# Patient Record
Sex: FEMALE | Race: WHITE | NOT HISPANIC OR LATINO | Employment: FULL TIME | URBAN - METROPOLITAN AREA
[De-identification: names, ages, dates, MRNs, and addresses within clinical notes are randomized per-mention and may not be internally consistent; named-entity substitution may affect disease eponyms.]

---

## 2017-08-03 ENCOUNTER — ALLSCRIPTS OFFICE VISIT (OUTPATIENT)
Dept: OTHER | Facility: OTHER | Age: 42
End: 2017-08-03

## 2017-08-03 DIAGNOSIS — Z12.31 ENCOUNTER FOR SCREENING MAMMOGRAM FOR MALIGNANT NEOPLASM OF BREAST: ICD-10-CM

## 2017-09-11 ENCOUNTER — HOSPITAL ENCOUNTER (OUTPATIENT)
Dept: MAMMOGRAPHY | Facility: MEDICAL CENTER | Age: 42
Discharge: HOME | End: 2017-09-11
Payer: COMMERCIAL

## 2017-09-11 DIAGNOSIS — Z12.31 ENCOUNTER FOR SCREENING MAMMOGRAM FOR MALIGNANT NEOPLASM OF BREAST: ICD-10-CM

## 2017-09-11 PROCEDURE — G0202 SCR MAMMO BI INCL CAD: HCPCS

## 2017-09-11 PROCEDURE — 77063 BREAST TOMOSYNTHESIS BI: CPT

## 2018-01-14 VITALS
BODY MASS INDEX: 21.63 KG/M2 | SYSTOLIC BLOOD PRESSURE: 120 MMHG | DIASTOLIC BLOOD PRESSURE: 78 MMHG | HEIGHT: 66 IN | WEIGHT: 134.56 LBS

## 2018-11-12 ENCOUNTER — EVALUATION (OUTPATIENT)
Dept: PHYSICAL THERAPY | Facility: CLINIC | Age: 43
End: 2018-11-12
Payer: COMMERCIAL

## 2018-11-12 ENCOUNTER — TRANSCRIBE ORDERS (OUTPATIENT)
Dept: PHYSICAL THERAPY | Facility: CLINIC | Age: 43
End: 2018-11-12

## 2018-11-12 DIAGNOSIS — Z98.890 S/P ARTHROSCOPY OF KNEE: Primary | ICD-10-CM

## 2018-11-12 DIAGNOSIS — Z98.890 S/P LEFT KNEE ARTHROSCOPY: Primary | ICD-10-CM

## 2018-11-12 PROCEDURE — G8991 OTHER PT/OT GOAL STATUS: HCPCS | Performed by: PHYSICAL THERAPIST

## 2018-11-12 PROCEDURE — G8990 OTHER PT/OT CURRENT STATUS: HCPCS | Performed by: PHYSICAL THERAPIST

## 2018-11-12 PROCEDURE — 97161 PT EVAL LOW COMPLEX 20 MIN: CPT | Performed by: PHYSICAL THERAPIST

## 2018-11-12 NOTE — LETTER
2018    Jay Jay Kamara MD  [address]    Patient: Christian Degroot   YOB: 1975   Date of Visit: 2018     Encounter Diagnosis     ICD-10-CM    1  S/P left knee arthroscopy Z98 890        Dear Dr Luisa Sauceda:    Please review the attached Plan of Care from Wesly Murguia's recent visit  Please verify that you agree therapy should continue by signing the attached document and sending it back to our office  If you have any questions or concerns, please don't hesitate to call  Sincerely,    Mello Zepeda, PT      Referring Provider:      I certify that I have read the below Plan of Care and certify the need for these services furnished under this plan of treatment while under my care  Jay Jay Kamara MD  [address] [place] [address],[address]: [phone]          PT Evaluation    Today's date: 2018  Patient name: Christian Degroot  : 1975  MRN: [id number]  Referring provider: Mary Beth Oliver*  Dx:   Encounter Diagnosis     ICD-10-CM    1  S/P left knee arthroscopy Z98 890                   Assessment  Impairments: abnormal muscle firing, abnormal muscle tone, abnormal or restricted ROM, abnormal movement, activity intolerance, impaired physical strength, lacks appropriate home exercise program and pain with function  Functional limitations: pain with stair climbing, walking longer distances, unable to participate in recreational activities, unable to work  Symptom irritability: low  Assessment details: Christian Degroot is a pleasant 37 y o  female who presents s/p L knee arthroscopy with partial medial menisectomy 10/25/18  Referral does not appear necessary at this time based on examination results  Swelling in her knee contributes to decreased ROM, which limits her ability to climb stairs at home  Impaired quad activation also contributes to increased pain when walking longer distances  Patient would benefit from skilled PT services to improve ROM and 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  Impairments include:  1) L knee swelling--> addressing with icing/elevating HEP and progressive exercise  2) Decreased L quad activation--> addressing with neuromotor re-training  3) Decreased L knee FLX/EXT ROM--> addressing with stretching and progressive exercise  4) Decreased L quads/HS strength--> addressing with progressive exercise      Barriers to therapy: none  Understanding of Dx/Px/POC: good   Prognosis: good  Prognosis details: Positive prognostic indicators include positive attitude toward recovery, lack of co-morbidities, and high PLOF  Goals  ST  Patient will be independent with home exercise program in 2 weeks  2  Patient will decrease swelling in her L knee in 2 weeks  LT  Patient will demonstrate symmetrical knee ROM bilaterally for improved stair climbing ability in 6 weeks  2  Patient will improve L knee FLX/EXT strength to be able to stand for prolonged periods in 6 weeks  3  Patient will be able to walk for 30 min with minimal knee pain in 6 weeks  4  Patient will be able to return to work with modifications as necessary in 6 weeks        Plan  Patient would benefit from: skilled PT  Referral necessary: No  Planned modality interventions: thermotherapy: hydrocollator packs and cryotherapy  Planned therapy interventions: activity modification, joint mobilization, manual therapy, motor coordination training, neuromuscular re-education, patient education, therapeutic activities, therapeutic exercise, graded activity, home exercise program, [place] taping, strengthening, flexibility, functional ROM exercises and graded exercise  Frequency: 2x week  Duration in weeks: 6  Treatment plan discussed with: patient  Plan details: Prognosis above is given PT services 2x/week tapering to 1x/week over the next 2 months and home program adherence  Subjective Evaluation    History of Present Illness  Date of surgery: 10/25/2018  Mechanism of injury: surgery  Mechanism of injury: This is a 36 yo female presenting s/p L partial medial menisectomy on 10/25/18  She is currently WBAT  She is not sure how she developed her meniscal tear, but her knee became sore after tennis  She thinks the actual tear happened when carrying a heavy box down the stairs, and she heard a pop  Her current functional limitations include pain with stair climbing and pain when turning with walking  She also would like to get back to running, playing tennis, and active lifestyle, and 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  She has a hx of 3 compression fractures in her thoracic and lumbar spine when she was 15  No other significant co-morbidities or surgical hx  Quality of life: good    Pain  Current pain ratin  At best pain ratin  At worst pain ratin  Location: anterior knee, medial knee, lateral knee  Quality: discomfort, throbbing and burning  Relieving factors: medications and ice (ibuprofen/tylenol)  Aggravating factors: standing, walking and stair climbing  Progression: improved    Social Support  Stairs in house: yes       Diagnostic Tests  X-ray: abnormal (per pt report- very mild arthritis)  MRI studies: abnormal (prior to surgery)  Patient Goals  Patient goals for therapy: decreased pain, increased motion, return to sport/leisure activities and return to work  Patient goal: return to active lifestyle        Objective     Observations   Left Knee   Positive for edema and incision  Additional Observation Details  Steri-strips intact; incisions unable to be visualized due to steri-strips  No redness/warmth/drainage present      Neurological Testing     Sensation     Knee   Left Knee   Diminished: light touch     Right Knee   Intact: light touch     Comments   Left light touch: lateral knee, medial knee, lateral leg       Active Range of Motion   Left Knee   Flexion: 125 degrees   Extension: 2 degrees     Right Knee   Flexion: 140 degrees   Extension: 0 degrees     Strength/Myotome Testing     Left Knee   Quadriceps contraction: fair    Right Knee   Flexion: 4+  Extension: 4+  Quadriceps contraction: good    Additional Strength Details  Did not test L knee strength due to being s/p menisectomy     Tests     Additional Tests Details  - Homans b/l     Special tests deferred due to surgery    Swelling     Left Knee Girth Measurement (cm)   Joint line: 36 cm  10 cm above joint line: 37 cm  10 cm below joint line: 33 cm    Right Knee Girth Measurement (cm)   Joint line: 35 cm  10 cm above joint line: 37 cm  10 cm below joint line: 32 cm          Precautions: hx of 3 compression fractures in thoracic/lumbar spines, s/p L knee arthroscopy with partial medial menisectomy 10/25/2018    Daily Treatment Diary     Manual  11/12                                                                                 Exercise Diary              QS HEP            Strap gastroc stretch HEP            Heel slides HEP            SLR HEP            Heel raises HEP            Mini squats HEP                                                                                                                                                                                                      Modalities

## 2018-11-12 NOTE — PROGRESS NOTES
PT Evaluation    Today's date: 2018  Patient name: Sherrill Villafana  : 1975  MRN: [id number]  Referring provider: Gwyn Stone*  Dx:   Encounter Diagnosis     ICD-10-CM    1  S/P left knee arthroscopy Z98 890                   Assessment  Impairments: abnormal muscle firing, abnormal muscle tone, abnormal or restricted ROM, abnormal movement, activity intolerance, impaired physical strength, lacks appropriate home exercise program and pain with function  Functional limitations: pain with stair climbing, walking longer distances, unable to participate in recreational activities, unable to work  Symptom irritability: low  Assessment details: Sherrill Villafana is a pleasant 37 y o  female who presents s/p L knee arthroscopy with partial medial menisectomy 10/25/18  Referral does not appear necessary at this time based on examination results  Swelling in her knee contributes to decreased ROM, which limits her ability to climb stairs at home  Impaired quad activation also contributes to increased pain when walking longer distances  Patient would benefit from skilled PT services to improve ROM and 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  Impairments include:  1) L knee swelling--> addressing with icing/elevating HEP and progressive exercise  2) Decreased L quad activation--> addressing with neuromotor re-training  3) Decreased L knee FLX/EXT ROM--> addressing with stretching and progressive exercise  4) Decreased L quads/HS strength--> addressing with progressive exercise      Barriers to therapy: none  Understanding of Dx/Px/POC: good   Prognosis: good  Prognosis details: Positive prognostic indicators include positive attitude toward recovery, lack of co-morbidities, and high PLOF  Goals  ST  Patient will be independent with home exercise program in 2 weeks  2  Patient will decrease swelling in her L knee in 2 weeks  LT   Patient will demonstrate symmetrical knee ROM bilaterally for improved stair climbing ability in 6 weeks  2  Patient will improve L knee FLX/EXT strength to be able to stand for prolonged periods in 6 weeks  3  Patient will be able to walk for 30 min with minimal knee pain in 6 weeks  4  Patient will be able to return to work with modifications as necessary in 6 weeks  Plan  Patient would benefit from: skilled PT  Referral necessary: No  Planned modality interventions: thermotherapy: hydrocollator packs and cryotherapy  Planned therapy interventions: activity modification, joint mobilization, manual therapy, motor coordination training, neuromuscular re-education, patient education, therapeutic activities, therapeutic exercise, graded activity, home exercise program, [place] taping, strengthening, flexibility, functional ROM exercises and graded exercise  Frequency: 2x week  Duration in weeks: 6  Treatment plan discussed with: patient  Plan details: Prognosis above is given PT services 2x/week tapering to 1x/week over the next 2 months and home program adherence  Subjective Evaluation    History of Present Illness  Date of surgery: 10/25/2018  Mechanism of injury: surgery  Mechanism of injury: This is a 38 yo female presenting s/p L partial medial menisectomy on 10/25/18  She is currently WBAT  She is not sure how she developed her meniscal tear, but her knee became sore after tennis  She thinks the actual tear happened when carrying a heavy box down the stairs, and she heard a pop  Her current functional limitations include pain with stair climbing and pain when turning with walking  She also would like to get back to running, playing tennis, and active lifestyle, and 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  She has a hx of 3 compression fractures in her thoracic and lumbar spine when she was 15  No other significant co-morbidities or surgical hx     Quality of life: good    Pain  Current pain ratin  At best pain rating: 1  At worst pain ratin  Location: anterior knee, medial knee, lateral knee  Quality: discomfort, throbbing and burning  Relieving factors: medications and ice (ibuprofen/tylenol)  Aggravating factors: standing, walking and stair climbing  Progression: improved    Social Support  Stairs in house: yes       Diagnostic Tests  X-ray: abnormal (per pt report- very mild arthritis)  MRI studies: abnormal (prior to surgery)  Patient Goals  Patient goals for therapy: decreased pain, increased motion, return to sport/leisure activities and return to work  Patient goal: return to active lifestyle        Objective     Observations   Left Knee   Positive for edema and incision  Additional Observation Details  Steri-strips intact; incisions unable to be visualized due to steri-strips  No redness/warmth/drainage present      Neurological Testing     Sensation     Knee   Left Knee   Diminished: light touch     Right Knee   Intact: light touch     Comments   Left light touch: lateral knee, medial knee, lateral leg       Active Range of Motion   Left Knee   Flexion: 125 degrees   Extension: 2 degrees     Right Knee   Flexion: 140 degrees   Extension: 0 degrees     Strength/Myotome Testing     Left Knee   Quadriceps contraction: fair    Right Knee   Flexion: 4+  Extension: 4+  Quadriceps contraction: good    Additional Strength Details  Did not test L knee strength due to being s/p menisectomy     Tests     Additional Tests Details  - Homans b/l     Special tests deferred due to surgery    Swelling     Left Knee Girth Measurement (cm)   Joint line: 36 cm  10 cm above joint line: 37 cm  10 cm below joint line: 33 cm    Right Knee Girth Measurement (cm)   Joint line: 35 cm  10 cm above joint line: 37 cm  10 cm below joint line: 32 cm          Precautions: hx of 3 compression fractures in thoracic/lumbar spines, s/p L knee arthroscopy with partial medial menisectomy 10/25/2018    Daily Treatment Diary     Manual   Exercise Diary              QS HEP            Strap gastroc stretch HEP            Heel slides HEP            SLR HEP            Heel raises HEP            Mini squats HEP                                                                                                                                                                                                      Modalities

## 2018-11-15 ENCOUNTER — OFFICE VISIT (OUTPATIENT)
Dept: PHYSICAL THERAPY | Facility: CLINIC | Age: 43
End: 2018-11-15
Payer: COMMERCIAL

## 2018-11-15 DIAGNOSIS — Z98.890 S/P LEFT KNEE ARTHROSCOPY: Primary | ICD-10-CM

## 2018-11-15 PROCEDURE — 97110 THERAPEUTIC EXERCISES: CPT

## 2018-11-15 NOTE — PROGRESS NOTES
Daily Note     Today's date: 11/15/2018  Patient name: Winsome Jj  : 1975  MRN: [id number]  Referring provider: Sadiq Rose*  Dx:   Encounter Diagnosis     ICD-10-CM    1  S/P left knee arthroscopy Z98 890                   Subjective: Patient reports HEP going well  Reports gets some pain with prolonged standing but overall feels good  Objective: See treatment diary below      Assessment: Patient challenged by exercises focusing on VMO strengthening  Patient expressed some discomfort with step ups secondary to knee instability  Plan: Continue per plan of care  Precautions: hx of 3 compression fractures in thoracic/lumbar spines, s/p L knee arthroscopy with partial medial menisectomy 10/25/2018    Daily Treatment Diary     Manual  11/12 11/15                                                                                Exercise Diary   11/15           QS HEP 10"x  10           Strap gastroc stretch HEP 30"x4           Heel slides HEP 5"x30           SLR x4 HEP 3x10 ea           Heel raises HEP -           Mini squats HEP 3x10           SAQ  3x10           LAQ  3x10           Lateral Step downs  P!            Standing hamstring curls  3x10           Hamstring stretch  30"x4                                                                                                                   [facility]  10'               Modalities

## 2018-11-19 ENCOUNTER — OFFICE VISIT (OUTPATIENT)
Dept: PHYSICAL THERAPY | Facility: CLINIC | Age: 43
End: 2018-11-19
Payer: COMMERCIAL

## 2018-11-19 DIAGNOSIS — Z98.890 S/P LEFT KNEE ARTHROSCOPY: Primary | ICD-10-CM

## 2018-11-19 PROCEDURE — 97010 HOT OR COLD PACKS THERAPY: CPT | Performed by: PHYSICAL THERAPIST

## 2018-11-19 PROCEDURE — 97110 THERAPEUTIC EXERCISES: CPT | Performed by: PHYSICAL THERAPIST

## 2018-11-19 NOTE — PROGRESS NOTES
Daily Note     Today's date: 2018  Patient name: Heron Campo  : 1975  MRN: [id number]  Referring provider: Kiley Ramsay*  Dx:   Encounter Diagnosis     ICD-10-CM    1  S/P left knee arthroscopy Z98 890                   Subjective: Pt  Reports increased pain for 2 days following last therapy session  Objective: See treatment diary below      Assessment:Valgus knee control is reduced in pt  Due to poor hip strength  Session focused on appropriate knee control during both CKC and OKC exercises in accordance with post op protocol  Pt  Educated on role of PT to promote tissue healing and post op function  Pt  Would benefit from continued PT to maximize function  Plan: Continue per plan of care  Progress treatment as tolerated        Precautions: hx of 3 compression fractures in thoracic/lumbar spines, s/p L knee arthroscopy with partial medial menisectomy 10/25/2018    Daily Treatment Diary     Manual  11/12 11/15 11/19          Patellar Mobz - - NV                                                                  Exercise Diary   11/15 11/19          QS HEP 10"x  10 5"/5" 3 min          Strap gastroc stretch HEP 30"x4 4x30"          Heel slides HEP 5"x30 5"x30          SLR x4 HEP 3x10 ea 30x ea          Heel raises HEP - -          Mini squats HEP 3x10 -          SAQ  3x10 3x10          LAQ  3x10 30x 90-40          Lateral Step downs  P! -          Standing hamstring curls  3x10 -          Hamstring stretch  30"x4           Ball Squats - - With nlei                                                                                                     [address] [place] [address]  10' 10'              Modalities

## 2018-11-21 ENCOUNTER — OFFICE VISIT (OUTPATIENT)
Dept: PHYSICAL THERAPY | Facility: CLINIC | Age: 43
End: 2018-11-21
Payer: COMMERCIAL

## 2018-11-21 DIAGNOSIS — Z98.890 S/P LEFT KNEE ARTHROSCOPY: Primary | ICD-10-CM

## 2018-11-21 PROCEDURE — 97110 THERAPEUTIC EXERCISES: CPT | Performed by: PHYSICAL THERAPIST

## 2018-11-21 NOTE — PROGRESS NOTES
Daily Note     Today's date: 2018  Patient name: Naomi Emerson  : 1975  MRN: [id number]  Referring provider: Shahrzad Bliss*  Dx:   Encounter Diagnosis     ICD-10-CM    1  S/P left knee arthroscopy Z98 890                   Subjective: Pt  Notes last session was tolerated much better than previous  No pain reported  Objective: See treatment diary below      Assessment: Annie Murguia was progressed with PT interventions, focusing on appropriate technique and intensity  Pt  Required frequent cuing from therapist to complete  Pt  Progressed with post op protocl beginning leg press and weighted SLR Pt  Would benefit from continued physical therapy to promote improved activity tolerance and function  Plan: Continue per plan of care  Progress treatment as tolerated        Precautions: hx of 3 compression fractures in thoracic/lumbar spines, s/p L knee arthroscopy with partial medial menisectomy 10/25/2018    Daily Treatment Diary     Manual  11/12 11/15 11/19 11/21         Patellar Mobz - - NV 8'                                                                 Exercise Diary   11/15 11/19 11/21         QS HEP 10"x  10 5"/5" 3 min HEP         Strap gastroc stretch HEP 30"x4 4x30" 4x30"         Heel slides HEP 5"x30 5"x30 -         SLR x4 HEP 3x10 ea 30x ea 30x ea 2#         Heel raises HEP - - See below         Mini squats HEP 3x10 - -         SAQ  3x10 3x10 2# 3x10         LAQ  3x10 30x 90-40 -         Lateral Step downs  P! - - -        Standing hamstring curls  3x10 - - -        Hamstring stretch  30"x4  4x30"         Ball Squats - - With clamshell -         Leg Press - - - 40# 2x10         TKE with TB - - - See below         Elliptical - - - - -        ITB Stretch - - - 4x30"         Heel rise with TKE - - - 2x15# 20x                                   [facility]  10' 10' 10' L2             Modalities

## 2018-11-26 ENCOUNTER — OFFICE VISIT (OUTPATIENT)
Dept: PHYSICAL THERAPY | Facility: CLINIC | Age: 43
End: 2018-11-26
Payer: COMMERCIAL

## 2018-11-26 DIAGNOSIS — Z98.890 S/P LEFT KNEE ARTHROSCOPY: Primary | ICD-10-CM

## 2018-11-26 PROCEDURE — 97110 THERAPEUTIC EXERCISES: CPT | Performed by: PHYSICAL THERAPIST

## 2018-11-26 NOTE — PROGRESS NOTES
Daily Note     Today's date: 2018  Patient name: Tiara Simmons  : 1975  MRN: [id number]  Referring provider: Flex Oliveira*  Dx:   Encounter Diagnosis     ICD-10-CM    1  S/P left knee arthroscopy Z98 890                   Subjective: Pt  Notes knee is feeling good  She feels current program is helping  Objective: See treatment diary below      Assessment: Pt  Began elliptical training per protocol and tolerated well  She requires cuing for knee stabilization during OKC and CKC exercise  Pt  Denies any pain following treatment  Pt  Would benefit form continued PT to maximize funciton and increase activity tolerance  Plan: Continue per plan of care  Progress treatment as tolerated        Precautions: hx of 3 compression fractures in thoracic/lumbar spines, s/p L knee arthroscopy with partial medial menisectomy 10/25/2018    Daily Treatment Diary     Manual  11/12 11/15 11/19 11/21 11/26        Patellar Mobz - - NV 8' -                                                                Exercise Diary   11/15 11/19 11/21 11/26        QS HEP 10"x  10 5"/5" 3 min HEP -        Strap gastroc stretch HEP 30"x4 4x30" 4x30"         Heel slides HEP 5"x30 5"x30 - -        SLR x4 HEP 3x10 ea 30x ea 30x ea 2# 30x 2# ea        Heel raises HEP - - See below -        Mini squats HEP 3x10 - - -        SAQ  3x10 3x10 2# 3x10 2# 3x10        LAQ  3x10 30x 90-40 - 2# 0-90 20x        Lateral Step downs  P! - - -        Standing hamstring curls  3x10 - - -        Hamstring stretch  30"x4  4x30" 4c30"        Ball Squats - - With clamshell - -        Leg Press - - - 40# 2x10 40# 3x10        TKE with TB - - - See below -        Elliptical - - - - L2 6 min        ITB Stretch - - - 4x30" 4x30"        Heel rise with TKE - - - 2x15# 20x 2x15" 30x                                  [address] [facility]  10' 10' 10' L2 10' L2            Modalities                   CP 10 min

## 2018-11-28 ENCOUNTER — OFFICE VISIT (OUTPATIENT)
Dept: PHYSICAL THERAPY | Facility: CLINIC | Age: 43
End: 2018-11-28
Payer: COMMERCIAL

## 2018-11-28 DIAGNOSIS — Z98.890 S/P LEFT KNEE ARTHROSCOPY: Primary | ICD-10-CM

## 2018-11-28 PROCEDURE — 97110 THERAPEUTIC EXERCISES: CPT | Performed by: PHYSICAL THERAPIST

## 2018-11-28 PROCEDURE — 97010 HOT OR COLD PACKS THERAPY: CPT

## 2018-11-28 NOTE — PROGRESS NOTES
Daily Note     Today's date: 2018  Patient name: Krystal Garcia  : 1975  MRN: [id number]  Referring provider: Ila Bales*  Dx:   No diagnosis found  Subjective: Pt  Notes knee is feeling good  "Just little more tightness the last few days  She reports heading to the doctor later today"  Objective: See treatment diary below      Assessment: Pt  Completed TE program within quad fatigue and tightness to LLE  Good tolerance to strengthening exercise but requires cues for correct reps and 's  Denies any elevated sx's pre-post session  Patient would benefit from cont PT to maximize function and increase to activity  Able to progress patient leg press 50# 2/10  Plan: Continue per plan of care  Progress treatment as tolerated        Precautions: hx of 3 compression fractures in thoracic/lumbar spines, s/p L knee arthroscopy with partial medial menisectomy 10/25/2018    Daily Treatment Diary     Manual  11/12 11/15 11/19 11/21 11/26 11/28       Patellar Mobz - - NV 8' -                                                                Exercise Diary   11/15 11/19 11/21 11/26 11/28       QS HEP 10"x  10 5"/5" 3 min HEP -        Strap gastroc stretch HEP 30"x4 4x30" 4x30"  4/30''       Heel slides HEP 5"x30 5"x30 - - 5/30''       SLR x4 HEP 3x10 ea 30x ea 30x ea 2# 30x 2# ea 30x  2# ea       Heel raises HEP - - See below -        Mini squats HEP 3x10 - - -        SAQ  3x10 3x10 2# 3x10 2# 3x10 2#  3x10       LAQ  3x10 30x 90-40 - 2# 0-90 20x 2#  0-90  20 x       Lateral Step downs  P! - - -        Standing hamstring curls  3x10 - - -        Hamstring stretch  30"x4  4x30" 4c30" 4x 30''       Ball Squats - - With clamshell - -        Leg Press - - - 40# 2x10 40# 3x10 50#  3x10       TKE with TB - - - See below -        Elliptical - - - - L2 6 min L2 5 min's       ITB Stretch - - - 4x30" 4x30" 4x30''       Heel rise with TKE - - - 2x15# 20x 2x15" 30x 2x(15#UE) 30x [address] [facility]  10' 10' 10' L2 10' L2 5' L2           Modalities      11/26 11/28            CP 10 min CP 10 min's

## 2018-12-04 ENCOUNTER — OFFICE VISIT (OUTPATIENT)
Dept: PHYSICAL THERAPY | Facility: CLINIC | Age: 43
End: 2018-12-04
Payer: COMMERCIAL

## 2018-12-04 DIAGNOSIS — Z98.890 S/P LEFT KNEE ARTHROSCOPY: Primary | ICD-10-CM

## 2018-12-04 PROCEDURE — 97140 MANUAL THERAPY 1/> REGIONS: CPT | Performed by: PHYSICAL THERAPIST

## 2018-12-04 PROCEDURE — 97110 THERAPEUTIC EXERCISES: CPT | Performed by: PHYSICAL THERAPIST

## 2018-12-04 NOTE — PROGRESS NOTES
Daily Note     Today's date: 2018  Patient name: Maxine Anderson  : 1975  MRN: [id number]  Referring provider: Leon Hyde*  Dx:   Encounter Diagnosis     ICD-10-CM    1  S/P left knee arthroscopy Z98 890                   Subjective: Pt  States increased lateral knee pain over the weekend which sh eattributes to painting her bathroom  Objective: See treatment diary below      Assessment: Annie Murguia was progressed with PT interventions, focusing on appropriate technique and intensity  Pt  Required mod cuing from therapist to complete  Pt progressed with CKC strenghtening to promote improved knee stability  Pt  Would benefit from continued physical therapy to promote improved activity tolerance and function  Plan: Continue per plan of care  Progress treatment as tolerated        Precautions: hx of 3 compression fractures in thoracic/lumbar spines, s/p L knee arthroscopy with partial medial menisectomy 10/25/2018    Daily Treatment Diary     Manual  11/12 11/15 11/19 11/21 11/26 11/28 12      Patellar Mobz - - NV 8' - - 3 min      IASTM - - - - - -  5 min      Foam Rolling - - - - - - 5 min                                    Exercise Diary   11/15 11/19 11/21 11/26 11/28 12/4      QS HEP 10"x  10 5"/5" 3 min HEP -  -      Strap gastroc stretch HEP 30"x4 4x30" 4x30"  4/30'' 4x30"      Heel slides HEP 5"x30 5"x30 - - 5/30'' -      SLR x4 HEP 3x10 ea 30x ea 30x ea 2# 30x 2# ea 30x  2# ea 2# 30x ea      Heel raises HEP - - See below -  -      Mini squats HEP 3x10 - - -  15# 20x      SAQ  3x10 3x10 2# 3x10 2# 3x10 2#  3x10 2# 30x      LAQ  3x10 30x 90-40 - 2# 0-90 20x 2#  0-90  20 x 2# 30x      Lateral Step downs  P! - - -  -      Standing hamstring curls  3x10 - - -  -      Hamstring stretch  30"x4  4x30" 4c30" 4x 30''       Ball Squats - - With clamshell - -  -      Leg Press - - - 40# 2x10 40# 3x10 50#  3x10 50# 3x10      TKE with TB - - - See below -  -      Elliptical - - - - L2 6 min L2 5 min's L2 5 min      ITB Stretch - - - 4x30" 4x30" 4x30'' 4x30"      Heel rise with TKE - - - 2x15# 20x 2x15" 30x 2x(15#UE) 30x 30x 15#      Bridges on bosu - - - - - - 30x with orange TB                   [address]  10' 10' 10' L2 10' L2 5' L2 10' L2          Modalities      11/26 11/28 12/4           CP 10 min CP 10 min's CP 10 min

## 2018-12-06 ENCOUNTER — OFFICE VISIT (OUTPATIENT)
Dept: PHYSICAL THERAPY | Facility: CLINIC | Age: 43
End: 2018-12-06
Payer: COMMERCIAL

## 2018-12-06 DIAGNOSIS — Z98.890 S/P LEFT KNEE ARTHROSCOPY: Primary | ICD-10-CM

## 2018-12-06 PROCEDURE — 97140 MANUAL THERAPY 1/> REGIONS: CPT

## 2018-12-06 PROCEDURE — 97110 THERAPEUTIC EXERCISES: CPT

## 2018-12-06 NOTE — PROGRESS NOTES
Daily Note     Today's date: 2018  Patient name: Naomi Emerson  : 1975  MRN: [id number]  Referring provider: Shahrzad Bliss*  Dx:   Encounter Diagnosis     ICD-10-CM    1  S/P left knee arthroscopy Z98 890                   Subjective: Patient reports pain on arrival a 0/10  Reports after last visit port sites were a little sore but next day she was fine  Objective: See treatment diary below      Assessment: Naomi Emerson continues with increase in function  Weakness appears to most limiting factor  Annie Murguia was able to perform exercises with proper technique and intensity  Jamie Chacon has increased  in ability to perform functional exercises  she would benefit from continued PT to decrease pain and improve daily function  Jamie Chacon is scheduled to return to work   Plan: Continue per plan of care  Monitor effect of RTW          Precautions: hx of 3 compression fractures in thoracic/lumbar spines, s/p L knee arthroscopy with partial medial menisectomy 10/25/2018    Daily Treatment Diary     Manual  11/12 11/15 11/19 11/21 11/26 11/28 12/4 12/6     Patellar Mobz - - NV 8' - - 3 min 3 min     IASTM - - - - - -  5 min 5 min     Foam Rolling - - - - - - 5 min 5 min                                   Exercise Diary   11/15 11/19 11/21 11/26 11/28 12/4 12     QS HEP 10"x  10 5"/5" 3 min HEP -  -      Strap gastroc stretch HEP 30"x4 4x30" 4x30"  4/30'' 4x30" 4x30"     Heel slides HEP 5"x30 5"x30 - - 5/30'' -      SLR x4 HEP 3x10 ea 30x ea 30x ea 2# 30x 2# ea 30x  2# ea 2# 30x ea 2# 30x ea     Heel raises HEP - - See below -  -      Mini squats HEP 3x10 - - -  15# 20x 15# 30x     SAQ  3x10 3x10 2# 3x10 2# 3x10 2#  3x10 2# 30x 2# 30x     LAQ  3x10 30x 90-40 - 2# 0-90 20x 2#  0-90  20 x 2# 30x 2# 30x     Lateral Step downs  P! - - -  -      Standing hamstring curls  3x10 - - -  -      Hamstring stretch  30"x4  4x30" 4c30" 4x 30''  4x30"     Ball Squats - - With clamshell - -  - Leg Press - - - 40# 2x10 40# 3x10 50#  3x10 50# 3x10 50# 3x10     TKE with TB - - - See below -  -      Elliptical - - - - L2 6 min L2 5 min's L2 5 min L2  5 min     ITB Stretch - - - 4x30" 4x30" 4x30'' 4x30" 4x30"     Heel rise with TKE - - - 2x15# 20x 2x15" 30x 2x(15#UE) 30x 30x 15# 30x 15#     Bridges on bosu - - - - - - 30x with orange TB 30x with orange TB                  [address] [facility]  10' 10' 10' L2 10' L2 5' L2 10' L2 10' L4         Modalities      11/26 11/28 12/4 12/6          CP 10 min CP 10 min's CP 10 min Ref

## 2018-12-13 ENCOUNTER — APPOINTMENT (OUTPATIENT)
Dept: PHYSICAL THERAPY | Facility: CLINIC | Age: 43
End: 2018-12-13
Payer: COMMERCIAL

## 2018-12-14 ENCOUNTER — APPOINTMENT (OUTPATIENT)
Dept: PHYSICAL THERAPY | Facility: CLINIC | Age: 43
End: 2018-12-14
Payer: COMMERCIAL

## 2018-12-18 ENCOUNTER — APPOINTMENT (OUTPATIENT)
Dept: PHYSICAL THERAPY | Facility: CLINIC | Age: 43
End: 2018-12-18
Payer: COMMERCIAL

## 2018-12-18 NOTE — PROGRESS NOTES
DC Summary:    12/18/18    Annie Murguia has not been been treated in PT since 12/8  Patient states she is self DC due to high cost  She is feeling near 100%     Sinan Murrieta, PT

## 2018-12-20 ENCOUNTER — APPOINTMENT (OUTPATIENT)
Dept: PHYSICAL THERAPY | Facility: CLINIC | Age: 43
End: 2018-12-20
Payer: COMMERCIAL

## 2018-12-24 ENCOUNTER — APPOINTMENT (OUTPATIENT)
Dept: PHYSICAL THERAPY | Facility: CLINIC | Age: 43
End: 2018-12-24
Payer: COMMERCIAL

## 2018-12-27 ENCOUNTER — APPOINTMENT (OUTPATIENT)
Dept: PHYSICAL THERAPY | Facility: CLINIC | Age: 43
End: 2018-12-27
Payer: COMMERCIAL

## 2019-09-06 ENCOUNTER — ANNUAL EXAM (OUTPATIENT)
Dept: OBGYN CLINIC | Facility: MEDICAL CENTER | Age: 44
End: 2019-09-06
Payer: COMMERCIAL

## 2019-09-06 VITALS
DIASTOLIC BLOOD PRESSURE: 66 MMHG | SYSTOLIC BLOOD PRESSURE: 110 MMHG | WEIGHT: 134.1 LBS | HEIGHT: 66 IN | BODY MASS INDEX: 21.55 KG/M2

## 2019-09-06 DIAGNOSIS — Z01.419 ENCNTR FOR GYN EXAM (GENERAL) (ROUTINE) W/O ABN FINDINGS: Primary | ICD-10-CM

## 2019-09-06 DIAGNOSIS — R10.2 PELVIC PAIN IN FEMALE: ICD-10-CM

## 2019-09-06 DIAGNOSIS — Z12.31 ENCOUNTER FOR SCREENING MAMMOGRAM FOR MALIGNANT NEOPLASM OF BREAST: ICD-10-CM

## 2019-09-06 PROCEDURE — S0612 ANNUAL GYNECOLOGICAL EXAMINA: HCPCS | Performed by: OBSTETRICS & GYNECOLOGY

## 2019-09-06 NOTE — PROGRESS NOTES
ASSESSMENT & PLAN: Adi Valentine was seen today for gynecologic exam     Diagnoses and all orders for this visit:    Encntr for gyn exam (general) (routine) w/o abn findings    Encounter for screening mammogram for malignant neoplasm of breast  -     Mammo screening bilateral w 3d & cad; Future    Pelvic pain in female  -     US pelvis complete w transvaginal; Future    Discussion/Summary:  Patient here for yearly gyn preventive exam; c/o LLQ discomfort, first noticed 4 months ago; also has had bowel issues of constipation; seen with ajce Fernandez a  student, [place]  Rx given for pelvicnu s  Will heed results and notify her  1   Routine well woman exam done today  2  Pap and HPV:  The patient's pap is up to date  Pap and cotesting was not done today  Current ASCCP Guidelines reviewed  3   Mammogram was ordered  4  The following were reviewed in today's visit: breast self exam, adequate intake of calcium and vitamin D, exercise and healthy diet  5  F/u 1 year  CC:  Annual Gynecologic Examination    HPI: Damine Mas is a 53 y o  [id number] who presents for annual gynecologic examination  She has the following concerns: none    Health Maintenance:    Patient describes her health as good  Patient does not have weight concerns  She exercises 7 days per week with work and walking  She does wear her seatbelt routinely  She does perform regular monthly self breast exams  She does feel safe at home  Patients does follow a  diet  Her pap: 2015  Last mammogram: 2018    There is no problem list on file for this patient  No past medical history on file  No past surgical history on file  Past OB/Gyn History:  Pt does not have menstrual issues,   History of sexually transmitted infection: No   History of abnormal pap smears: No    Patient is currently sexually active  monogamous   The current method of family planning is vasectomy  No family history on file      Social History:  Social History     Socioeconomic History    Marital status:      Spouse name: Not on file    Number of children: Not on file    Years of education: Not on file    Highest education level: Not on file   Occupational History    Not on file   Social Needs    Financial resource strain: Not on file    Food insecurity:     Worry: Not on file     Inability: Not on file    Transportation needs:     Medical: Not on file     Non-medical: Not on file   Tobacco Use    Smoking status: Never Smoker    Smokeless tobacco: Never Used   Substance and Sexual Activity    Alcohol use: Never     Frequency: Never    Drug use: Never    Sexual activity: Yes     Partners: Male     Birth control/protection: Male Sterilization     Comment: new partner    Lifestyle    Physical activity:     Days per week: Not on file     Minutes per session: Not on file    Stress: Not on file   Relationships    Social connections:     Talks on phone: Not on file     Gets together: Not on file     Attends Yarsani service: Not on file     Active member of club or organization: Not on file     Attends meetings of clubs or organizations: Not on file     Relationship status: Not on file    Intimate partner violence:     Fear of current or ex partner: Not on file     Emotionally abused: Not on file     Physically abused: Not on file     Forced sexual activity: Not on file   Other Topics Concern    Not on file   Social History Narrative    Not on file     Presently lives with family  Patient is currently employed     No Known Allergies    No current outpatient medications on file  Review of Systems  Constitutional :no fever, feels well, no tiredness, no recent weight gain or loss  ENT: no ear ache, no loss of hearing, no nosebleeds or nasal discharge, no sore throat or hoarseness    Cardiovascular: no complaints of slow or fast heart beat, no chest pain, no palpitations, no leg claudication or lower extremity edema  Respiratory: no complaints of shortness of shortness of breath, no GUERRERO  Breasts:no complaints of breast pain, breast lump, or nipple discharge  Gastrointestinal: no complaints of abdominal pain, constipation, nausea, vomiting, or diarrhea or bloody stools  Genitourinary : no complaints of dysuria, incontinence, pelvic pain, no dysmenorrhea, vaginal discharge or abnormal vaginal bleeding and as noted in HPI  Musculoskeletal: no complaints of arthralgia, no myalgia, no joint swelling or stiffness, no limb pain or swelling  Integumentary: no complaints of skin rash or lesion, itching or dry skin  Neurological: no complaints of headache, no confusion, no numbness or tingling, no dizziness or fainting    Physical Exam:     /66   Ht 5' 6" (1 676 m)   Wt 60 8 kg (134 lb 1 6 oz)   LMP 08/16/2019   Breastfeeding? No   BMI 21 64 kg/m²     General: appears stated age, cooperative, alert normal mood and affect   Psychiatric oriented to person, place and time  Mood and affect normal   Neck: normal, supple,trachea midline, no masses  Thyroid: normal, no thyromegaly   Heart: regular rate and rhythm, S1, S2 normal, no murmur, click, rub or gallop   Lungs: clear to auscultation bilaterally, no increased work of breathing or signs of respiratory distress   Breasts: normal, no dimpling or skin changes noted   Abdomen: soft, non-tender, without masses or organomegaly   Vulva: normal , no lesions   Vagina: normal , no lesions or dryness   Urethra: normal   Urethal meatus normal   Bladder Normal, soft, non-tender and no prolapse or masses appreciated   Cervix: normal, no palpable masses    Uterus: normal , non-tender, not enlarged, no palpable masses   Adnexa: normal, non-tender without fullness or masses; did not notice a fullness I  Left adnexa; hemoccult neg     Lymphatic Palpation of lymph nodes in neck, axilla, groin and/or other locations: no lymphadenopathy or masses noted   Skin Normal skin turgor and no rashes    Palpation of skin and subcutaneous tissue normal

## 2019-09-06 NOTE — PATIENT INSTRUCTIONS
expecrtingfirst grandchild with her daughter, Mera (child to be named Riddhi Crespo) father of child born in [facility]

## 2021-03-25 ENCOUNTER — ANNUAL EXAM (OUTPATIENT)
Dept: OBGYN CLINIC | Facility: MEDICAL CENTER | Age: 46
End: 2021-03-25
Payer: COMMERCIAL

## 2021-03-25 VITALS
WEIGHT: 147 LBS | BODY MASS INDEX: 23.63 KG/M2 | SYSTOLIC BLOOD PRESSURE: 110 MMHG | HEIGHT: 66 IN | DIASTOLIC BLOOD PRESSURE: 80 MMHG

## 2021-03-25 DIAGNOSIS — Z12.31 ENCOUNTER FOR SCREENING MAMMOGRAM FOR MALIGNANT NEOPLASM OF BREAST: ICD-10-CM

## 2021-03-25 DIAGNOSIS — G43.829 MENSTRUAL MIGRAINE WITHOUT STATUS MIGRAINOSUS, NOT INTRACTABLE: ICD-10-CM

## 2021-03-25 DIAGNOSIS — Z84.81 FAMILY HISTORY OF BREAST CANCER GENE MUTATION IN FIRST DEGREE RELATIVE: ICD-10-CM

## 2021-03-25 DIAGNOSIS — Z01.419 WELL WOMAN EXAM WITH ROUTINE GYNECOLOGICAL EXAM: Primary | ICD-10-CM

## 2021-03-25 PROCEDURE — G0145 SCR C/V CYTO,THINLAYER,RESCR: HCPCS | Performed by: OBSTETRICS & GYNECOLOGY

## 2021-03-25 PROCEDURE — S0612 ANNUAL GYNECOLOGICAL EXAMINA: HCPCS | Performed by: OBSTETRICS & GYNECOLOGY

## 2021-03-25 PROCEDURE — 87624 HPV HI-RISK TYP POOLED RSLT: CPT | Performed by: OBSTETRICS & GYNECOLOGY

## 2021-03-25 RX ORDER — SUMATRIPTAN 25 MG/1
25 TABLET, FILM COATED ORAL ONCE AS NEEDED
Qty: 30 TABLET | Refills: 2 | Status: SHIPPED | OUTPATIENT
Start: 2021-03-25

## 2021-03-25 RX ORDER — DOXYCYCLINE HYCLATE 100 MG/1
CAPSULE ORAL
COMMUNITY
End: 2022-04-11 | Stop reason: ALTCHOICE

## 2021-03-25 RX ORDER — ONDANSETRON 4 MG/1
TABLET, ORALLY DISINTEGRATING ORAL
COMMUNITY

## 2021-03-25 NOTE — PROGRESS NOTES
Assessment   50 y o  [id number] with regular menses who is sexually active and currently not using hormonal contraception (partner s/p vasectomy) presenting for annual exam      Plan   Diagnoses and all orders for this visit:    Well woman exam with routine gynecological exam  -     Liquid-based pap, screening  - Mammo slip given  - Clinicians breast exam done  - Return in 1yr for yearly    Encounter for screening mammogram for malignant neoplasm of breast  -     Mammo screening bilateral w 3d & cad; Future    Menstrual migraine without status migrainosus, not intractable  -     SUMAtriptan (IMITREX) 25 mg tablet; Take 1 tablet (25 mg total) by mouth once as needed for migraine for up to 1 dose  - Discussed etiology and treatment options  Family history of breast cancer gene mutation in first degree relative  -     Ambulatory Referral to Breast Surgery; Future      __________________________________________________________________      Subjective     Jodie Rodríguez is a 50 y o  [id number] with regular menses who is sexually active and currently not using hormonal contraception (partner s/p vasectomy) presenting for annual exam  She is reporting migraines  Patient notes she gets migraines for many years, but has noticed lately that they are more consistently occurring with her menses  Can get them just prior to or during flow  Does sometimes get them other times, but less frequently  Ibuprofen + Tylenol at adequate doses do not help  Tried fioricet before and also was not very helpful  GYN  Complaints: denies  Denies dysmenorrhea, dyspareunia, genital discharge, genital ulcers, irregular/heavy menses, pelvic pain and vulvar/vaginal symptoms  Periods are regular every 28-30 days, lasting 3 days  Dysmenorrhea:none     Cyclic symptoms include breast tenderness  Sexually active: Yes - single partner - male (boyfriend)  Hx STI: denies   Hx Abnormal pap: denies  Last pap: 2015 - NILM    OB  [id number] ( x2)  Pregnancy complications: breech delivery      Complaints: denies  Denies urinary frequency, hematuria, urinary incontinence and dysuria    BREAST  Complaints: denies  Denies: breast lump, breast tenderness, changed mole, dryness, nipple discharge, pruritus, rash, skin color change and skin lesion(s)  Last mammogram: 2017   Personal hx: small fatty changes, benign; dense breast  Family hx:   Mother with breast ca 2x (different types, 1st at 48, 58)  Mom is has "cancer gene," but patient unsure which one  Maternal grandmother with breast ca  Maternal aunt with ovarian ca  Patient does do regular self-exams    GENERAL  PMH reviewed/updated and is as below  Patient does not follow with a PCP  Listed PCP moved and looking for another  Works as a RN at [employer]  Denies domestic violence  Exercise: used to run, slowed down with covid  Outdoor sports with nicer weather  Diet: nothing specific    SCREENING  Cervical Ca: pap collected  Breast Ca: mammo slip given  Colon Ca: not indicated by age      Past Medical History:   Diagnosis Date    Migraine    compression fracture at 14 (traumatic; horse fell on her)    Past Surgical History:   Procedure Laterality Date    KNEE SURGERY           Current Outpatient Medications:     doxycycline hyclate (VIBRAMYCIN) 100 mg capsule, doxycycline hyclate 100 mg capsule, Disp: , Rfl:     ondansetron (ZOFRAN-ODT) 4 mg disintegrating tablet, ondansetron 4 mg disintegrating tablet, Disp: , Rfl:     No Known Allergies    Social History     Tobacco Use    Smoking status: Never Smoker    Smokeless tobacco: Never Used   Substance Use Topics    Alcohol use: Not Currently    Drug use: Never           Objective  /80   Ht 5' 6" (1 676 m)   Wt 66 7 kg (147 lb)   LMP 03/01/2021 (Exact Date)   BMI 23 73 kg/m²      Physical Exam:  Physical Exam  Exam conducted with a chaperone present  Constitutional:       General: She is not in acute distress       Appearance: Normal appearance  She is well-developed  She is not ill-appearing, toxic-appearing or diaphoretic  HENT:      Head: Normocephalic and atraumatic  Eyes:      General: No scleral icterus  Right eye: No discharge  Left eye: No discharge  Conjunctiva/sclera: Conjunctivae normal    Cardiovascular:      Rate and Rhythm: Normal rate  Pulmonary:      Effort: Pulmonary effort is normal  No accessory muscle usage or respiratory distress  Chest:      Breasts:         Right: No inverted nipple, mass, nipple discharge, skin change or tenderness  Left: No inverted nipple, mass, nipple discharge, skin change or tenderness  Abdominal:      General: There is no distension  Palpations: Abdomen is soft  There is no mass  Tenderness: There is no abdominal tenderness  There is no guarding or rebound  Genitourinary:     General: Normal vulva  Exam position: Lithotomy position  Labia:         Right: No rash, tenderness or lesion  Left: No rash, tenderness or lesion  Vagina: No signs of injury (Exam completed with student Pily Crutch)  No vaginal discharge, erythema, tenderness or bleeding  Cervix: No cervical motion tenderness, discharge, friability, lesion or erythema  Uterus: Not enlarged, not fixed and not tender  Adnexa:         Right: No mass, tenderness or fullness  Left: No mass, tenderness or fullness  Rectum: No external hemorrhoid  Normal anal tone  Comments: Urethral meatus: normal  Skin:     General: Skin is warm and dry  Findings: No erythema or rash  Neurological:      Mental Status: She is alert  Psychiatric:         Mood and Affect: Mood normal          Behavior: Behavior normal          Thought Content:  Thought content normal          Judgment: Judgment normal

## 2021-03-26 LAB
HPV HR 12 DNA CVX QL NAA+PROBE: NEGATIVE
HPV16 DNA CVX QL NAA+PROBE: NEGATIVE
HPV18 DNA CVX QL NAA+PROBE: NEGATIVE

## 2021-03-30 LAB
LAB AP GYN PRIMARY INTERPRETATION: NORMAL
Lab: NORMAL

## 2021-04-06 ENCOUNTER — HOSPITAL ENCOUNTER (OUTPATIENT)
Dept: RADIOLOGY | Facility: CLINIC | Age: 46
Discharge: HOME | End: 2021-04-06
Payer: COMMERCIAL

## 2021-04-06 VITALS — BODY MASS INDEX: 23.63 KG/M2 | HEIGHT: 66 IN | WEIGHT: 147 LBS

## 2021-04-06 DIAGNOSIS — Z12.31 ENCOUNTER FOR SCREENING MAMMOGRAM FOR MALIGNANT NEOPLASM OF BREAST: ICD-10-CM

## 2021-04-06 PROCEDURE — 77063 BREAST TOMOSYNTHESIS BI: CPT

## 2021-04-06 PROCEDURE — 77067 SCR MAMMO BI INCL CAD: CPT

## 2022-01-01 ENCOUNTER — OFFICE VISIT (OUTPATIENT)
Dept: URGENT CARE | Facility: CLINIC | Age: 47
End: 2022-01-01
Payer: COMMERCIAL

## 2022-01-01 VITALS
RESPIRATION RATE: 18 BRPM | HEART RATE: 85 BPM | OXYGEN SATURATION: 99 % | TEMPERATURE: 98.6 F | BODY MASS INDEX: 23.3 KG/M2 | WEIGHT: 145 LBS | HEIGHT: 66 IN

## 2022-01-01 DIAGNOSIS — R68.89 FLU-LIKE SYMPTOMS: Primary | ICD-10-CM

## 2022-01-01 LAB — S PYO AG THROAT QL: NEGATIVE

## 2022-01-01 PROCEDURE — 99203 OFFICE O/P NEW LOW 30 MIN: CPT | Performed by: EMERGENCY MEDICINE

## 2022-01-01 PROCEDURE — 87636 SARSCOV2 & INF A&B AMP PRB: CPT | Performed by: EMERGENCY MEDICINE

## 2022-01-01 PROCEDURE — 87880 STREP A ASSAY W/OPTIC: CPT | Performed by: EMERGENCY MEDICINE

## 2022-01-01 RX ORDER — PSEUDOEPHEDRINE HCL 60 MG/1
60 TABLET ORAL EVERY 4 HOURS PRN
COMMUNITY
End: 2022-04-11 | Stop reason: ALTCHOICE

## 2022-01-01 NOTE — PROGRESS NOTES
[address] [facility]        NAME: Darlene Leone is a 48 y o  female  : 1975    MRN: [id number]  DATE: 2022  TIME: 10:29 AM    Assessment and Plan   Flu-like symptoms [R68 89]  1  Flu-like symptoms  COVID/FLU- Office Collect    POCT rapid strepA         Patient Instructions     Patient Instructions     You have been diagnosed with a flu-like illness, and your symptoms should resolve over the next 7 to 10 days with the treatments recommended today  If they do not, it is possible that you have developed a bacterial infection and you should return  If you were to take an antibiotic while you are still in the viral stage, you will not get better any faster, but could kill off good germs in your body as well as make germs resistant to the antibiotic  Take an expectorant - guaifenesin should be the only ingredient - during the day, and the cough suppressant (ex  Robitussin DM or Tessalon) if needed at night only  Take Zinc 50 mg every 12 hours for the next week  You should also take Quercetin 500 mg twice daily with it  You should also take vitamin D3 5000 i u s per day for the next 1 week, and vitamin-C 1 g daily  May take Flonase as discussed  You may also take a decongestant like Sudafed, unless you have hypertension or cardiac disease  You may take Imodium for diarrhea according to package instructions  Flu-like illness   AMBULATORY CARE:   Flu-like illness is an infection caused by a virus  The flu is easily spread when an infected person coughs, sneezes, or has close contact with others  You may be able to spread the flu to others for 1 week or longer after signs or symptoms appear     Common signs and symptoms include the following:   · Fever and chills    · Headaches, body aches, and muscle or joint pain    · Cough, runny nose, and sore throat    · Loss of appetite, nausea, vomiting, or diarrhea    · Tiredness    · Trouble breathing  Call 911 for any of the following:   · You have trouble breathing, and your lips look purple or blue  · You have a seizure  Seek care immediately if:   · You are dizzy, or you are urinating less or not at all  · You have a headache with a stiff neck, and you feel tired or confused  · You have new pain or pressure in your chest     · Your symptoms, such as shortness of breath, vomiting, or diarrhea, get worse  · Your symptoms, such as fever and coughing, seem to get better, but then get worse  Contact your healthcare provider if:   · You have new muscle pain or weakness  · You have questions or concerns about your condition or care  Treatment for influenza  may include any of the following:  · Acetaminophen decreases pain and fever  It is available without a doctor's order  Ask how much to take and how often to take it  Follow directions  Acetaminophen can cause liver damage if not taken correctly  · NSAIDs  such as ibuprofen, help decrease swelling, pain, and fever  This medicine is available with or without a doctor's order  NSAIDs can cause stomach bleeding or kidney problems in certain people  If you take blood thinner medicine, always ask your healthcare provider if NSAIDs are safe for you  Always read the medicine label and follow directions  · Antivirals  help fight a viral infection  Manage your symptoms:   · Rest  as much as you can to help you recover  · Drink liquids as directed  to help prevent dehydration  Ask how much liquid to drink each day and which liquids are best for you  Prevent the spread of the flu:   · Wash your hands often  Use soap and water  Wash your hands after you use the bathroom, change a child's diapers, or sneeze  Wash your hands before you prepare or eat food  Use gel hand cleanser when soap and water are not available  Do not touch your eyes, nose, or mouth unless you have washed your hands first        · Cover your mouth when you sneeze or cough    Cough into a tissue or the bend of your arm     · Clean shared items with a germ-killing   Clean table surfaces, doorknobs, and light switches  Do not share towels, silverware, and dishes with people who are sick  Wash bed sheets, towels, silverware, and dishes with soap and water  · Wear a mask  over your mouth and nose if you are sick or are near anyone who is sick  · Stay away from others  if you are sick  · Influenza vaccine  helps prevent influenza (flu)  Everyone older than 6 months should get a yearly influenza vaccine  Get the vaccine as soon as it is available, usually in September or October each year  Follow up with your healthcare provider as directed:  Write down your questions so you remember to ask them during your visits  © 2017 [address]  Information is for End User's use only and may not be sold, redistributed or otherwise used for commercial purposes  All illustrations and images included in CareNotes® are the copyrighted property of A D A M [employer] Inc  or [employer]  The above information is an  only  It is not intended as medical advice for individual conditions or treatments  Talk to your doctor, nurse or pharmacist before following any medical regimen to see if it is safe and effective for you  [address]     Your healthcare provider and/or public health staff have evaluated you and have determined that you do not need to be hospitalized at this time  At this time you can be isolated at home where you will be monitored by staff from your local or state health department  You should carefully follow the prevention and isolation steps below until a healthcare provider or local or state health department says that you can return to your normal activities  Stay home except to get medical care     People who are mildly ill with COVID-19 are able to isolate at home during their illness   You should restrict activities outside your home, except for getting medical care  Do not go to work, school, or public areas  Avoid using public transportation, ride-sharing, or taxis  Separate yourself from other people and animals in your home     People: As much as possible, you should stay in a specific room and away from other people in your home  Also, you should use a separate bathroom, if available  Animals: You should restrict contact with pets and other animals while you are sick with COVID-19, just like you would around other people  Although there have not been reports of pets or other animals becoming sick with COVID-19, it is still recommended that people sick with COVID-19 limit contact with animals until more information is known about the virus  When possible, have another member of your household care for your animals while you are sick  If you are sick with COVID-19, avoid contact with your pet, including petting, snuggling, being kissed or licked, and sharing food  If you must care for your pet or be around animals while you are sick, wash your hands before and after you interact with pets and wear a facemask  See COVID-19 and Animals for more information  Call ahead before visiting your doctor     If you have a medical appointment, call the healthcare provider and tell them that you have or may have COVID-19  This will help the healthcare providers office take steps to keep other people from getting infected or exposed  Wear a facemask     You should wear a facemask when you are around other people (e g , sharing a room or vehicle) or pets and before you enter a healthcare providers office  If you are not able to wear a facemask (for example, because it causes trouble breathing), then people who live with you should not stay in the same room with you, or they should wear a facemask if they enter your room  Cover your coughs and sneezes     Cover your mouth and nose with a tissue when you cough or sneeze   Throw used tissues in a lined trash can  Immediately wash your hands with soap and water for at least 20 seconds or, if soap and water are not available, clean your hands with an alcohol-based hand  that contains at least 60% alcohol  Clean your hands often     Wash your hands often with soap and water for at least 20 seconds, especially after blowing your nose, coughing, or sneezing; going to the bathroom; and before eating or preparing food  If soap and water are not readily available, use an alcohol-based hand  with at least 60% alcohol, covering all surfaces of your hands and rubbing them together until they feel dry  Soap and water are the best option if hands are visibly dirty  Avoid touching your eyes, nose, and mouth with unwashed hands  Avoid sharing personal household items     You should not share dishes, drinking glasses, cups, eating utensils, towels, or bedding with other people or pets in your home  After using these items, they should be washed thoroughly with soap and water  Clean all high-touch surfaces everyday     High touch surfaces include counters, tabletops, doorknobs, bathroom fixtures, toilets, phones, keyboards, tablets, and bedside tables  Also, clean any surfaces that may have blood, stool, or body fluids on them  Use a household cleaning spray or wipe, according to the label instructions  Labels contain instructions for safe and effective use of the cleaning product including precautions you should take when applying the product, such as wearing gloves and making sure you have good ventilation during use of the product  Monitor your symptoms     Seek prompt medical attention if your illness is worsening (e g , difficulty breathing)  Before seeking care, call your healthcare provider and tell them that you have, or are being evaluated for, COVID-19  Put on a facemask before you enter the facility   These steps will help the healthcare providers office to keep other people in the office or waiting room from getting infected or exposed  Ask your healthcare provider to call the local or state health department  Persons who are placed under active monitoring or facilitated self-monitoring should follow instructions provided by their local health department or occupational health professionals, as appropriate  If you have a medical emergency and need to call 911, notify the dispatch personnel that you have, or are being evaluated for COVID-19  If possible, put on a facemask before emergency medical services arrive  Discontinuing home isolation     Patients with confirmed COVID-19 should remain under home isolation precautions until the risk of secondary transmission to others is thought to be low  The decision to discontinue home isolation precautions should be made on a case-by-case basis, in consultation with healthcare providers and [facility] and local health departments  Source: [employer]        Proceed to ER if symptoms worsen      Fluid In The Ear (Serous Otitis Media)   WHAT YOU NEED TO KNOW:   EVER is fluid trapped in the middle of your ear behind your eardrum  This condition usually develops without signs or symptoms of an ear infection  Serous otitis media may be caused by an upper respiratory infection or allergies  It is most common in the fall and early spring  DISCHARGE INSTRUCTIONS:   Call your doctor if:   · You develop severe ear pain  · The outside of your ear is red or swollen  · You have fluid coming from your ear  · You have questions or concerns about your condition or care  How to stay healthy:   · Wash your hands often throughout the day  Use soap and water  Rub your soapy hands together, lacing your fingers, for at least 20 seconds  Rinse with warm, running water  Dry your hands with a clean towel or paper towel   Use hand  that contains alcohol if soap and water are not available  Teach children how to wash their hands and use hand   · Avoid people who are sick  Some germs are easily and quickly spread through contact  Follow up with your doctor as directed:  Write down your questions so you remember to ask them during your visits  © Copyright [address] 2021 Information is for End User's use only and may not be sold, redistributed or otherwise used for commercial purposes  All illustrations and images included in CareNotes® are the copyrighted property of A D A M , Inc  or [address] [place] [address]  The above information is an  only  It is not intended as medical advice for individual conditions or treatments  Talk to your doctor, nurse or pharmacist before following any medical regimen to see if it is safe and effective for you  Follow up with PCP in 3-5 days  Proceed to  ER if symptoms worsen  Chief Complaint     Chief Complaint   Patient presents with    COVID-19     nasal congestion, post nasal drip , right ear pain and sore throat         History of Present Illness       Patient complains of sore throat, congestion, right ear pain for the past day  Review of Systems   Review of Systems   Constitutional: Negative for appetite change, chills, fatigue and fever  HENT: Positive for congestion, rhinorrhea, sinus pressure and sore throat  Negative for trouble swallowing and voice change  Respiratory: Positive for cough  Negative for chest tightness, shortness of breath and wheezing  Cardiovascular: Negative for chest pain  Gastrointestinal: Negative for nausea  Musculoskeletal: Negative for myalgias           Current Medications       Current Outpatient Medications:     pseudoephedrine (SUDAFED) 60 mg tablet, Take 60 mg by mouth every 4 (four) hours as needed for congestion, Disp: , Rfl:     doxycycline hyclate (VIBRAMYCIN) 100 mg capsule, doxycycline hyclate 100 mg capsule, Disp: , Rfl:     ondansetron (ZOFRAN-ODT) 4 mg disintegrating tablet, ondansetron 4 mg disintegrating tablet, Disp: , Rfl:     SUMAtriptan (IMITREX) 25 mg tablet, Take 1 tablet (25 mg total) by mouth once as needed for migraine for up to 1 dose, Disp: 30 tablet, Rfl: 2    Current Allergies     Allergies as of 01/01/2022    (No Known Allergies)            The following portions of the patient's history were reviewed and updated as appropriate: allergies, current medications, past family history, past medical history, past social history, past surgical history and problem list      Past Medical History:   Diagnosis Date    Migraine        Past Surgical History:   Procedure Laterality Date    KNEE SURGERY         Family History   Problem Relation Age of Onset   Osman Pradhan Breast cancer Mother 46         & age 61 - diagnosed twice with breast ca   Diabetes Mother     Heart disease Father     Diabetes Father     Breast cancer Maternal Grandmother     Ovarian cancer Maternal Aunt 40    No Known Problems Sister     No Known Problems Daughter     No Known Problems Maternal Grandfather     No Known Problems Paternal Grandmother     No Known Problems Paternal Grandfather     No Known Problems Maternal Aunt     No Known Problems Maternal Aunt     No Known Problems Maternal Aunt     No Known Problems Paternal Aunt     No Known Problems Paternal Aunt          Medications have been verified  Objective   Pulse 85   Temp 98 6 °F (37 °C)   Resp 18   Ht 5' 6" (1 676 m)   Wt 65 8 kg (145 lb)   LMP 12/20/2021   SpO2 99%   BMI 23 40 kg/m²        Physical Exam     Physical Exam  Vitals and nursing note reviewed  Constitutional:       General: She is not in acute distress  Appearance: She is well-developed  HENT:      Head: Normocephalic and atraumatic  Nose: Mucosal edema and congestion present  Mouth/Throat:      Pharynx: Oropharynx is clear  Posterior oropharyngeal erythema present  No oropharyngeal exudate  Tonsils: No tonsillar abscesses  Cardiovascular:      Rate and Rhythm: Normal rate and regular rhythm  Heart sounds: Normal heart sounds  No murmur heard  No friction rub  No gallop  Pulmonary:      Effort: Pulmonary effort is normal  No respiratory distress  Breath sounds: No wheezing or rales  Musculoskeletal:      Cervical back: Neck supple  Skin:     General: Skin is warm and dry  Neurological:      Mental Status: She is alert and oriented to person, place, and time  Psychiatric:         Mood and Affect: Mood normal          Behavior: Behavior normal          Thought Content:  Thought content normal          Judgment: Judgment normal

## 2022-01-01 NOTE — PATIENT INSTRUCTIONS
You have been diagnosed with a flu-like illness, and your symptoms should resolve over the next 7 to 10 days with the treatments recommended today  If they do not, it is possible that you have developed a bacterial infection and you should return  If you were to take an antibiotic while you are still in the viral stage, you will not get better any faster, but could kill off good germs in your body as well as make germs resistant to the antibiotic  Take an expectorant - guaifenesin should be the only ingredient - during the day, and the cough suppressant (ex  Robitussin DM or Tessalon) if needed at night only  Take Zinc 50 mg every 12 hours for the next week  You should also take Quercetin 500 mg twice daily with it  You should also take vitamin D3 5000 i u s per day for the next 1 week, and vitamin-C 1 g daily  May take Flonase as discussed  You may also take a decongestant like Sudafed, unless you have hypertension or cardiac disease  You may take Imodium for diarrhea according to package instructions  Flu-like illness   AMBULATORY CARE:   Flu-like illness is an infection caused by a virus  The flu is easily spread when an infected person coughs, sneezes, or has close contact with others  You may be able to spread the flu to others for 1 week or longer after signs or symptoms appear  Common signs and symptoms include the following:   · Fever and chills    · Headaches, body aches, and muscle or joint pain    · Cough, runny nose, and sore throat    · Loss of appetite, nausea, vomiting, or diarrhea    · Tiredness    · Trouble breathing  Call 911 for any of the following:   · You have trouble breathing, and your lips look purple or blue  · You have a seizure  Seek care immediately if:   · You are dizzy, or you are urinating less or not at all  · You have a headache with a stiff neck, and you feel tired or confused      · You have new pain or pressure in your chest     · Your symptoms, such as shortness of breath, vomiting, or diarrhea, get worse  · Your symptoms, such as fever and coughing, seem to get better, but then get worse  Contact your healthcare provider if:   · You have new muscle pain or weakness  · You have questions or concerns about your condition or care  Treatment for influenza  may include any of the following:  · Acetaminophen decreases pain and fever  It is available without a doctor's order  Ask how much to take and how often to take it  Follow directions  Acetaminophen can cause liver damage if not taken correctly  · NSAIDs  such as ibuprofen, help decrease swelling, pain, and fever  This medicine is available with or without a doctor's order  NSAIDs can cause stomach bleeding or kidney problems in certain people  If you take blood thinner medicine, always ask your healthcare provider if NSAIDs are safe for you  Always read the medicine label and follow directions  · Antivirals  help fight a viral infection  Manage your symptoms:   · Rest  as much as you can to help you recover  · Drink liquids as directed  to help prevent dehydration  Ask how much liquid to drink each day and which liquids are best for you  Prevent the spread of the flu:   · Wash your hands often  Use soap and water  Wash your hands after you use the bathroom, change a child's diapers, or sneeze  Wash your hands before you prepare or eat food  Use gel hand cleanser when soap and water are not available  Do not touch your eyes, nose, or mouth unless you have washed your hands first        · Cover your mouth when you sneeze or cough  Cough into a tissue or the bend of your arm  · Clean shared items with a germ-killing   Clean table surfaces, doorknobs, and light switches  Do not share towels, silverware, and dishes with people who are sick  Wash bed sheets, towels, silverware, and dishes with soap and water       · Wear a mask  over your mouth and nose if you are sick or are near anyone who is sick  · Stay away from others  if you are sick  · Influenza vaccine  helps prevent influenza (flu)  Everyone older than 6 months should get a yearly influenza vaccine  Get the vaccine as soon as it is available, usually in September or October each year  Follow up with your healthcare provider as directed:  Write down your questions so you remember to ask them during your visits  © 2017 [address] Information is for End User's use only and may not be sold, redistributed or otherwise used for commercial purposes  All illustrations and images included in CareNotes® are the copyrighted property of [employer]  or [employer] Tame[employer]  The above information is an  only  It is not intended as medical advice for individual conditions or treatments  Talk to your doctor, nurse or pharmacist before following any medical regimen to see if it is safe and effective for you  [address]     Your healthcare provider and/or public health staff have evaluated you and have determined that you do not need to be hospitalized at this time  At this time you can be isolated at home where you will be monitored by staff from your local or state health department  You should carefully follow the prevention and isolation steps below until a healthcare provider or local or state health department says that you can return to your normal activities  Stay home except to get medical care     People who are mildly ill with COVID-19 are able to isolate at home during their illness  You should restrict activities outside your home, except for getting medical care  Do not go to work, school, or public areas  Avoid using public transportation, ride-sharing, or taxis  Separate yourself from other people and animals in your home     People: As much as possible, you should stay in a specific room and away from other people in your home   Also, you should use a separate bathroom, if available  Animals: You should restrict contact with pets and other animals while you are sick with COVID-19, just like you would around other people  Although there have not been reports of pets or other animals becoming sick with COVID-19, it is still recommended that people sick with COVID-19 limit contact with animals until more information is known about the virus  When possible, have another member of your household care for your animals while you are sick  If you are sick with COVID-19, avoid contact with your pet, including petting, snuggling, being kissed or licked, and sharing food  If you must care for your pet or be around animals while you are sick, wash your hands before and after you interact with pets and wear a facemask  See COVID-19 and Animals for more information  Call ahead before visiting your doctor     If you have a medical appointment, call the healthcare provider and tell them that you have or may have COVID-19  This will help the healthcare providers office take steps to keep other people from getting infected or exposed  Wear a facemask     You should wear a facemask when you are around other people (e g , sharing a room or vehicle) or pets and before you enter a healthcare providers office  If you are not able to wear a facemask (for example, because it causes trouble breathing), then people who live with you should not stay in the same room with you, or they should wear a facemask if they enter your room  Cover your coughs and sneezes     Cover your mouth and nose with a tissue when you cough or sneeze  Throw used tissues in a lined trash can  Immediately wash your hands with soap and water for at least 20 seconds or, if soap and water are not available, clean your hands with an alcohol-based hand  that contains at least 60% alcohol       Clean your hands often     Wash your hands often with soap and water for at least 20 seconds, especially after blowing your nose, coughing, or sneezing; going to the bathroom; and before eating or preparing food  If soap and water are not readily available, use an alcohol-based hand  with at least 60% alcohol, covering all surfaces of your hands and rubbing them together until they feel dry  Soap and water are the best option if hands are visibly dirty  Avoid touching your eyes, nose, and mouth with unwashed hands  Avoid sharing personal household items     You should not share dishes, drinking glasses, cups, eating utensils, towels, or bedding with other people or pets in your home  After using these items, they should be washed thoroughly with soap and water  Clean all high-touch surfaces everyday     High touch surfaces include counters, tabletops, doorknobs, bathroom fixtures, toilets, phones, keyboards, tablets, and bedside tables  Also, clean any surfaces that may have blood, stool, or body fluids on them  Use a household cleaning spray or wipe, according to the label instructions  Labels contain instructions for safe and effective use of the cleaning product including precautions you should take when applying the product, such as wearing gloves and making sure you have good ventilation during use of the product  Monitor your symptoms     Seek prompt medical attention if your illness is worsening (e g , difficulty breathing)  Before seeking care, call your healthcare provider and tell them that you have, or are being evaluated for, COVID-19  Put on a facemask before you enter the facility  These steps will help the healthcare providers office to keep other people in the office or waiting room from getting infected or exposed  Ask your healthcare provider to call the local or [facility] health department   Persons who are placed under active monitoring or facilitated self-monitoring should follow instructions provided by their local health department or occupational health professionals, as appropriate  If you have a medical emergency and need to call 911, notify the dispatch personnel that you have, or are being evaluated for COVID-19  If possible, put on a facemask before emergency medical services arrive  Discontinuing home isolation     Patients with confirmed COVID-19 should remain under home isolation precautions until the risk of secondary transmission to others is thought to be low  The decision to discontinue home isolation precautions should be made on a case-by-case basis, in consultation with healthcare providers and state and local health departments  Source: [employer]        Proceed to ER if symptoms worsen      Fluid In The Ear (Serous Otitis Media)   WHAT YOU NEED TO KNOW:   EVER is fluid trapped in the middle of your ear behind your eardrum  This condition usually develops without signs or symptoms of an ear infection  Serous otitis media may be caused by an upper respiratory infection or allergies  It is most common in the fall and early spring  DISCHARGE INSTRUCTIONS:   Call your doctor if:   · You develop severe ear pain  · The outside of your ear is red or swollen  · You have fluid coming from your ear  · You have questions or concerns about your condition or care  How to stay healthy:   · Wash your hands often throughout the day  Use soap and water  Rub your soapy hands together, lacing your fingers, for at least 20 seconds  Rinse with warm, running water  Dry your hands with a clean towel or paper towel  Use hand  that contains alcohol if soap and water are not available  Teach children how to wash their hands and use hand   · Avoid people who are sick  Some germs are easily and quickly spread through contact  Follow up with your doctor as directed:  Write down your questions so you remember to ask them during your visits     © Copyright [employer] 2021 Information is for [employer] use only and may not be sold, redistributed or otherwise used for commercial purposes  All illustrations and images included in CareNotes® are the copyrighted property of A [employer] M , Inc  or Jenae Aguila  The above information is an  only  It is not intended as medical advice for individual conditions or treatments  Talk to your doctor, nurse or pharmacist before following any medical regimen to see if it is safe and effective for you

## 2022-01-01 NOTE — LETTER
January 1, 2022     Patient: Fabien Stoelo   YOB: 1975   Date of Visit: 1/1/2022       To Whom it May Concern:    David Leos was seen in my clinic on 1/1/2022  She should remain out of work/school for 5 days since symptom onset or 24 hours fever free without the use of fever reducing drugs, whichever is longer AND overall general improvement in symptoms       If you have any questions or concerns, please don't hesitate to call           Sincerely,          Lynne Lawrence MD        CC: No Recipients

## 2022-01-02 LAB
FLUAV RNA RESP QL NAA+PROBE: NEGATIVE
FLUBV RNA RESP QL NAA+PROBE: NEGATIVE
SARS-COV-2 RNA RESP QL NAA+PROBE: NEGATIVE

## 2022-04-11 ENCOUNTER — ANNUAL EXAM (OUTPATIENT)
Dept: OBGYN CLINIC | Facility: MEDICAL CENTER | Age: 47
End: 2022-04-11
Payer: COMMERCIAL

## 2022-04-11 VITALS
SYSTOLIC BLOOD PRESSURE: 120 MMHG | BODY MASS INDEX: 23.78 KG/M2 | WEIGHT: 148 LBS | HEIGHT: 66 IN | DIASTOLIC BLOOD PRESSURE: 80 MMHG

## 2022-04-11 DIAGNOSIS — R92.2 DENSE BREAST: ICD-10-CM

## 2022-04-11 DIAGNOSIS — Z12.31 BREAST CANCER SCREENING BY MAMMOGRAM: ICD-10-CM

## 2022-04-11 DIAGNOSIS — Z91.89 INCREASED RISK OF BREAST CANCER: ICD-10-CM

## 2022-04-11 DIAGNOSIS — Z01.419 WELL WOMAN EXAM WITH ROUTINE GYNECOLOGICAL EXAM: Primary | ICD-10-CM

## 2022-04-11 DIAGNOSIS — Z84.81 FAMILY HISTORY OF BREAST CANCER GENE MUTATION IN FIRST DEGREE RELATIVE: ICD-10-CM

## 2022-04-11 PROCEDURE — S0612 ANNUAL GYNECOLOGICAL EXAMINA: HCPCS | Performed by: OBSTETRICS & GYNECOLOGY

## 2022-04-11 NOTE — PROGRESS NOTES
Assessment   48 y o  [id number] with regular menses who is sexually active and currently not using hormonal contraception (partner s/p vasectomy) presenting for annual exam      Plan   Diagnoses and all orders for this visit:    Well woman exam with routine gynecological exam  - Pap up to date  - Mammo ordered  - Return in 1yr for yearly    Breast cancer screening by mammogram  -     Mammo screening bilateral w 3d & cad; Future  - Task sent to check ABUS coverage (6mo after mammo)    Dense breast  Increased risk of breast cancer  Family history of breast cancer gene mutation in first degree relative  - Called [facility] for questions regarding genetics, but felt pressured with their approach  - Undecided on genetic testing  Understands can do via our office if desired  - Discussed TC risk score, density, and family hx  Reviewed adjunctive testing  Agreeable to ABUS if covered      __________________________________________________________________      Subjective     Fredy Major is a 48 y o  with regular menses who is sexually active and currently not using hormonal contraception (partner s/p vasectomy) presenting for annual exam  She is reporting migraines      GYN  Complaints: denies  Denies dysmenorrhea, dyspareunia, genital discharge, genital ulcers, irregular/heavy menses, pelvic pain and vulvar/vaginal symptoms  Periods are regular every 28-30 days, lasting 3 days  Dysmenorrhea:none     Cyclic symptoms include breast tenderness  Sexually active: Yes - single partner - male (boyfriend)  Hx STI: denies   Hx Abnormal pap: denies  Last pap:  - NILM, HPV neg     OB   ( x2)  Pregnancy complications: breech delivery       Complaints: denies  Denies urinary frequency, hematuria, urinary incontinence and dysuria     BREAST  Complaints: denies  Denies: breast lump, breast tenderness, changed mole, dryness, nipple discharge, pruritus, rash, skin color change and skin lesion(s)  Last mammogram:  - birads1, TC lifetime risk 29%   Personal hx: small fatty changes, benign; dense breast  Family hx:   Mother with breast ca 2x (different types, 1st at 48, 58)  Mom is has "cancer gene," but patient unsure which one  Maternal grandmother with breast ca  Maternal aunt with ovarian ca  Patient does do regular self-exams     GENERAL  PMH reviewed/updated and is as below  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)  Denies domestic violence  Exercise: treadmill/ new routine  Diet: nothing specific     SCREENING  Cervical Ca: pap up to date  Breast Ca: mammo slip given  Colon Ca: not indicated by age      Past Medical History:   Diagnosis Date    Migraine        Past Surgical History:   Procedure Laterality Date    KNEE SURGERY           Current Outpatient Medications:     ondansetron (ZOFRAN-ODT) 4 mg disintegrating tablet, ondansetron 4 mg disintegrating tablet, Disp: , Rfl:     SUMAtriptan (IMITREX) 25 mg tablet, Take 1 tablet (25 mg total) by mouth once as needed for migraine for up to 1 dose (Patient not taking: Reported on 4/11/2022 ), Disp: 30 tablet, Rfl: 2    No Known Allergies    Social History     Tobacco Use    Smoking status: Never Smoker    Smokeless tobacco: Never Used   Vaping Use    Vaping Use: Never used   Substance Use Topics    Alcohol use: Not Currently    Drug use: Never           Objective  /80   Ht 5' 6" (1 676 m)   Wt 67 1 kg (148 lb)   LMP 04/04/2022   BMI 23 89 kg/m²      Physical Exam:  Physical Exam  Exam conducted with a chaperone present  Constitutional:       General: She is not in acute distress  Appearance: Normal appearance  She is well-developed  She is not ill-appearing, toxic-appearing or diaphoretic  HENT:      Head: Normocephalic and atraumatic  Eyes:      General: No scleral icterus  Right eye: No discharge  Left eye: No discharge  Conjunctiva/sclera: Conjunctivae normal    Cardiovascular:      Rate and Rhythm: Normal rate     Pulmonary: Effort: Pulmonary effort is normal  No accessory muscle usage or respiratory distress  Chest:   Breasts: Breasts are symmetrical       Right: No inverted nipple, mass, nipple discharge, skin change, tenderness or axillary adenopathy  Left: No inverted nipple, mass, nipple discharge, skin change, tenderness or axillary adenopathy  Abdominal:      General: There is no distension  Palpations: Abdomen is soft  There is no mass  Tenderness: There is no abdominal tenderness  There is no guarding or rebound  Genitourinary:     General: Normal vulva  Exam position: Lithotomy position  Labia:         Right: No rash, tenderness or lesion  Left: No rash, tenderness or lesion  Urethra: No prolapse, urethral swelling or urethral lesion  Vagina: No signs of injury  No vaginal discharge, erythema or tenderness  Cervix: No cervical motion tenderness, discharge, friability, lesion or erythema  Uterus: Not enlarged, not fixed and not tender  Adnexa:         Right: No mass, tenderness or fullness  Left: No mass, tenderness or fullness  Rectum: No external hemorrhoid  Normal anal tone  Comments: Urethral meatus: normal  Lymphadenopathy:      Upper Body:      Right upper body: No axillary or pectoral adenopathy  Left upper body: No axillary or pectoral adenopathy  Skin:     General: Skin is warm and dry  Coloration: Skin is not jaundiced  Findings: No bruising, erythema or rash  Neurological:      Mental Status: She is alert  Psychiatric:         Mood and Affect: Mood normal          Behavior: Behavior normal          Thought Content:  Thought content normal          Judgment: Judgment normal

## 2022-04-12 ENCOUNTER — TELEPHONE (OUTPATIENT)
Dept: OBGYN CLINIC | Facility: MEDICAL CENTER | Age: 47
End: 2022-04-12

## 2022-04-12 DIAGNOSIS — R92.2 DENSE BREAST: Primary | ICD-10-CM

## 2022-04-12 DIAGNOSIS — Z84.81 FAMILY HISTORY OF BREAST CANCER GENE MUTATION IN FIRST DEGREE RELATIVE: ICD-10-CM

## 2022-04-12 DIAGNOSIS — Z91.89 INCREASED RISK OF BREAST CANCER: ICD-10-CM

## 2022-04-12 NOTE — TELEPHONE ENCOUNTER
----- Message from Gricelda Acharya MD sent at 4/11/2022  1:37 PM EDT -----  Regarding: check coverage for Cristino Feng is at increased risk of breast ca based on density and family hx  We discussed adjunctive testing with ABUS and she is interested in this if covered  Please assess insurance coverage and schedule   May use codes linked to her yearly

## 2022-04-12 NOTE — TELEPHONE ENCOUNTER
PT IS COVERED FOR CODE 24678  NO PA REQUIRED  REF#  (LETTER) I [id number]  [facility]

## 2022-04-22 ENCOUNTER — HOSPITAL ENCOUNTER (OUTPATIENT)
Dept: RADIOLOGY | Facility: CLINIC | Age: 47
Discharge: HOME | End: 2022-04-22
Payer: COMMERCIAL

## 2022-04-22 VITALS — WEIGHT: 148 LBS | HEIGHT: 66 IN | BODY MASS INDEX: 23.78 KG/M2

## 2022-04-22 DIAGNOSIS — Z12.31 BREAST CANCER SCREENING BY MAMMOGRAM: ICD-10-CM

## 2022-04-22 PROCEDURE — 77067 SCR MAMMO BI INCL CAD: CPT

## 2022-04-22 PROCEDURE — 77063 BREAST TOMOSYNTHESIS BI: CPT

## 2022-11-07 ENCOUNTER — HOSPITAL ENCOUNTER (OUTPATIENT)
Dept: ULTRASOUND IMAGING | Facility: CLINIC | Age: 47
Discharge: HOME | End: 2022-11-07

## 2022-11-07 VITALS — HEIGHT: 66 IN | WEIGHT: 143 LBS | BODY MASS INDEX: 22.98 KG/M2

## 2022-11-07 DIAGNOSIS — R92.2 DENSE BREAST: ICD-10-CM

## 2022-11-07 DIAGNOSIS — Z91.89 INCREASED RISK OF BREAST CANCER: ICD-10-CM

## 2022-11-07 DIAGNOSIS — Z84.81 FAMILY HISTORY OF BREAST CANCER GENE MUTATION IN FIRST DEGREE RELATIVE: ICD-10-CM

## 2022-11-18 ENCOUNTER — HOSPITAL ENCOUNTER (OUTPATIENT)
Dept: ULTRASOUND IMAGING | Facility: CLINIC | Age: 47
Discharge: HOME | End: 2022-11-18

## 2022-11-18 DIAGNOSIS — R92.8 ABNORMAL MAMMOGRAM: ICD-10-CM

## 2023-04-25 ENCOUNTER — HOSPITAL ENCOUNTER (OUTPATIENT)
Dept: RADIOLOGY | Facility: CLINIC | Age: 48
Discharge: HOME | End: 2023-04-25

## 2023-04-25 ENCOUNTER — ANNUAL EXAM (OUTPATIENT)
Dept: OBGYN CLINIC | Facility: MEDICAL CENTER | Age: 48
End: 2023-04-25

## 2023-04-25 VITALS
BODY MASS INDEX: 23.99 KG/M2 | HEIGHT: 66 IN | SYSTOLIC BLOOD PRESSURE: 110 MMHG | WEIGHT: 149.3 LBS | DIASTOLIC BLOOD PRESSURE: 84 MMHG

## 2023-04-25 VITALS — HEIGHT: 66 IN | WEIGHT: 140 LBS | BODY MASS INDEX: 22.5 KG/M2

## 2023-04-25 DIAGNOSIS — Z12.31 BREAST CANCER SCREENING BY MAMMOGRAM: ICD-10-CM

## 2023-04-25 DIAGNOSIS — Z12.31 ENCOUNTER FOR SCREENING MAMMOGRAM FOR MALIGNANT NEOPLASM OF BREAST: ICD-10-CM

## 2023-04-25 DIAGNOSIS — Z01.419 WELL WOMAN EXAM WITH ROUTINE GYNECOLOGICAL EXAM: Primary | ICD-10-CM

## 2023-04-25 DIAGNOSIS — Z91.89 INCREASED RISK OF BREAST CANCER: ICD-10-CM

## 2023-04-25 DIAGNOSIS — Z12.11 COLON CANCER SCREENING: ICD-10-CM

## 2023-04-25 RX ORDER — CYCLOSPORINE 0.5 MG/ML
EMULSION OPHTHALMIC
COMMUNITY
Start: 2023-04-10

## 2023-04-25 NOTE — PROGRESS NOTES
"Assessment   50 y o  [id number] presenting for annual exam      Plan   Diagnoses and all orders for this visit:    Well woman exam with routine gynecological exam  - Pap up to date  - Mammo/ABUS discussed  - Cologuard ordered  - Return in 1yr for yearly    Encounter for screening mammogram for malignant neoplasm of breast  Increased risk of breast cancer  -     Mammo scheduled  - [place] breast screening bilateral complete (ABUS); Future    Colon cancer screening  -     Cologuard    __________________________________________________________________      Subjective     Abhi Hughes is a 50 y o  [id number] with regular menses who is sexually active and currently not using hormonal contraception (partner s/p vasectomy) presenting for annual exam  She is reporting migraines      GYN  Complaints: denies  Denies dysmenorrhea, dyspareunia, genital discharge, genital ulcers, irregular/heavy menses, pelvic pain and vulvar/vaginal symptoms  Periods are regular every 28-30 days, lasting 3 days  Dysmenorrhea: none  Cyclic symptoms include breast tenderness  Sexually active: Yes - single partner - male (boyfriend)  Hx STI: denies   Hx Abnormal pap: denies  Last pap:  - NILM, HPV neg     OB   ( x2)  Pregnancy complications: breech delivery       Complaints: denies  Denies urinary frequency, hematuria, urinary incontinence and dysuria     BREAST  Complaints: denies  Denies: breast lump, breast tenderness, changed mole, dryness, nipple discharge, pruritus, rash, skin color change and skin lesion(s)  Last mammogram:  - birads2, TC lifetime risk 29%   Personal hx: small fatty changes, benign; dense breast  Family hx:   Mother with breast ca 2x (different types, 1st at 48, 58)  Mom is has \"cancer gene,\" but patient unsure which one  Maternal grandmother with breast ca  Maternal aunt with ovarian ca  Patient does do regular self-exams     GENERAL  PMH reviewed/updated and is as below     Vestibular neuritis " "post-COVID  Resolved s/p therapy  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)  Denies domestic violence  Exercise: treadmill/ new routine  Diet: nothing specific     SCREENING  Cervical Ca: pap up to date  Breast Ca: mammo slip given  Colon Ca: options reviewed; interested in cologuard        Past Medical History:   Diagnosis Date   • Migraine        Past Surgical History:   Procedure Laterality Date   • KNEE SURGERY           Current Outpatient Medications:   •  ondansetron (ZOFRAN-ODT) 4 mg disintegrating tablet, ondansetron 4 mg disintegrating tablet, Disp: , Rfl:   •  Restasis 0 05 % ophthalmic emulsion, , Disp: , Rfl:   •  SUMAtriptan (IMITREX) 25 mg tablet, Take 1 tablet (25 mg total) by mouth once as needed for migraine for up to 1 dose, Disp: 30 tablet, Rfl: 2    No Known Allergies    Social History     Tobacco Use   • Smoking status: Never   • Smokeless tobacco: Never   Vaping Use   • Vaping Use: Never used   Substance Use Topics   • Alcohol use: Not Currently   • Drug use: Never           Objective  /84   Ht 5' 6\" (1 676 m)   Wt 67 7 kg (149 lb 4 8 oz)   LMP 04/03/2023 Comment: denies pregnancy  BMI 24 10 kg/m²      Physical Exam:  Physical Exam  Exam conducted with a chaperone present  Constitutional:       General: She is not in acute distress  Appearance: Normal appearance  She is well-developed  She is not ill-appearing, toxic-appearing or diaphoretic  HENT:      Head: Normocephalic and atraumatic  Eyes:      General: No scleral icterus  Right eye: No discharge  Left eye: No discharge  Conjunctiva/sclera: Conjunctivae normal    Cardiovascular:      Rate and Rhythm: Normal rate  Pulmonary:      Effort: Pulmonary effort is normal  No accessory muscle usage or respiratory distress  Chest:   Breasts:     Breasts are symmetrical       Right: No inverted nipple, mass, nipple discharge, skin change or tenderness        Left: No inverted nipple, mass, nipple discharge, skin " change or tenderness  Abdominal:      General: There is no distension  Palpations: Abdomen is soft  There is no mass  Tenderness: There is no abdominal tenderness  There is no guarding or rebound  Genitourinary:     General: Normal vulva  Exam position: Lithotomy position  Labia:         Right: No rash, tenderness or lesion  Left: No rash, tenderness or lesion  Urethra: No prolapse, urethral swelling or urethral lesion  Vagina: No signs of injury  No vaginal discharge, erythema, tenderness, bleeding or lesions  Cervix: No cervical motion tenderness, discharge, friability, lesion or erythema  Uterus: Not enlarged, not fixed and not tender  Adnexa:         Right: No mass, tenderness or fullness  Left: No mass, tenderness or fullness  Rectum: No external hemorrhoid  Normal anal tone  Lymphadenopathy:      Upper Body:      Right upper body: No axillary or pectoral adenopathy  Left upper body: No axillary or pectoral adenopathy  Skin:     General: Skin is warm and dry  Findings: No erythema or rash  Neurological:      Mental Status: She is alert  Psychiatric:         Mood and Affect: Mood normal          Behavior: Behavior normal          Thought Content:  Thought content normal          Judgment: Judgment normal

## 2023-12-22 ENCOUNTER — HOSPITAL ENCOUNTER (OUTPATIENT)
Dept: ULTRASOUND IMAGING | Facility: CLINIC | Age: 48
Discharge: HOME | End: 2023-12-22
Payer: COMMERCIAL

## 2023-12-22 VITALS — BODY MASS INDEX: 23.95 KG/M2 | WEIGHT: 149 LBS | HEIGHT: 66 IN

## 2023-12-22 DIAGNOSIS — Z91.89 INCREASED RISK OF BREAST CANCER: ICD-10-CM

## 2023-12-22 PROCEDURE — 76641 ULTRASOUND BREAST COMPLETE: CPT

## 2024-05-28 ENCOUNTER — HOSPITAL ENCOUNTER (OUTPATIENT)
Dept: RADIOLOGY | Facility: CLINIC | Age: 49
Discharge: HOME | End: 2024-05-28
Payer: COMMERCIAL

## 2024-05-28 VITALS — HEIGHT: 66 IN | WEIGHT: 143 LBS | BODY MASS INDEX: 22.98 KG/M2

## 2024-05-28 DIAGNOSIS — Z12.31 BREAST CANCER SCREENING BY MAMMOGRAM: ICD-10-CM

## 2024-05-28 PROCEDURE — 77067 SCR MAMMO BI INCL CAD: CPT

## 2024-05-28 PROCEDURE — 77063 BREAST TOMOSYNTHESIS BI: CPT

## 2024-06-04 ENCOUNTER — ANNUAL EXAM (OUTPATIENT)
Dept: OBGYN CLINIC | Facility: MEDICAL CENTER | Age: 49
End: 2024-06-04
Payer: COMMERCIAL

## 2024-06-04 VITALS
BODY MASS INDEX: 23.33 KG/M2 | SYSTOLIC BLOOD PRESSURE: 124 MMHG | DIASTOLIC BLOOD PRESSURE: 78 MMHG | HEIGHT: 66 IN | WEIGHT: 145.2 LBS

## 2024-06-04 DIAGNOSIS — R39.9 UTI SYMPTOMS: ICD-10-CM

## 2024-06-04 DIAGNOSIS — Z91.89 INCREASED RISK OF BREAST CANCER: ICD-10-CM

## 2024-06-04 DIAGNOSIS — Z12.11 COLON CANCER SCREENING: ICD-10-CM

## 2024-06-04 DIAGNOSIS — Z12.31 BREAST CANCER SCREENING BY MAMMOGRAM: ICD-10-CM

## 2024-06-04 DIAGNOSIS — R92.30 DENSE BREASTS: ICD-10-CM

## 2024-06-04 DIAGNOSIS — Z01.419 WELL WOMAN EXAM WITH ROUTINE GYNECOLOGICAL EXAM: Primary | ICD-10-CM

## 2024-06-04 LAB
BACTERIA UR QL AUTO: ABNORMAL /HPF
BILIRUB UR QL STRIP: NEGATIVE
CLARITY UR: CLEAR
COLOR UR: ABNORMAL
GLUCOSE UR STRIP-MCNC: NEGATIVE MG/DL
HGB UR QL STRIP.AUTO: NEGATIVE
KETONES UR STRIP-MCNC: NEGATIVE MG/DL
LEUKOCYTE ESTERASE UR QL STRIP: ABNORMAL
NITRITE UR QL STRIP: NEGATIVE
NON-SQ EPI CELLS URNS QL MICRO: ABNORMAL /HPF
PH UR STRIP.AUTO: 7 [PH]
PROT UR STRIP-MCNC: ABNORMAL MG/DL
RBC #/AREA URNS AUTO: ABNORMAL /HPF
SP GR UR STRIP.AUTO: 1.01 (ref 1–1.03)
UROBILINOGEN UR STRIP-ACNC: <2 MG/DL
WBC #/AREA URNS AUTO: ABNORMAL /HPF

## 2024-06-04 PROCEDURE — 87186 SC STD MICRODIL/AGAR DIL: CPT | Performed by: OBSTETRICS & GYNECOLOGY

## 2024-06-04 PROCEDURE — 87147 CULTURE TYPE IMMUNOLOGIC: CPT | Performed by: OBSTETRICS & GYNECOLOGY

## 2024-06-04 PROCEDURE — G0145 SCR C/V CYTO,THINLAYER,RESCR: HCPCS | Performed by: OBSTETRICS & GYNECOLOGY

## 2024-06-04 PROCEDURE — S0612 ANNUAL GYNECOLOGICAL EXAMINA: HCPCS | Performed by: OBSTETRICS & GYNECOLOGY

## 2024-06-04 PROCEDURE — 87086 URINE CULTURE/COLONY COUNT: CPT | Performed by: OBSTETRICS & GYNECOLOGY

## 2024-06-04 PROCEDURE — 81001 URINALYSIS AUTO W/SCOPE: CPT | Performed by: OBSTETRICS & GYNECOLOGY

## 2024-06-04 PROCEDURE — 87077 CULTURE AEROBIC IDENTIFY: CPT | Performed by: OBSTETRICS & GYNECOLOGY

## 2024-06-04 PROCEDURE — G0476 HPV COMBO ASSAY CA SCREEN: HCPCS | Performed by: OBSTETRICS & GYNECOLOGY

## 2024-06-04 NOTE — PROGRESS NOTES
Assessment   48 y.o.  presenting for annual exam.     Plan   Diagnoses and all orders for this visit:    Well woman exam with routine gynecological exam  -     Liquid-based pap, screening  -     HPV High Risk  - Mammo/ABUS screening ongoing  - Cologuard ordered  - Return in 1yr for yearly    Breast cancer screening by mammogram  Increased risk of breast cancer  Dense breasts  -     Mammp up to date; next scheduled  - US breast screening bilateral complete (ABUS); Future    Colon cancer screening  - Cologuard ordered    UTI symptoms  -     Urinalysis with microscopic  -     Urine culture    __________________________________________________________________      Subjective     Annie Murguia is a 48 y.o.  with regular menses who is sexually active and currently not using hormonal contraception (partner s/p vasectomy) presenting for annual exam.     Thinks psb UTI. New urgency as of this AM, feeling of incomplete void. Denies hematuria, dysuria, suprapubic pain.     GYN  Complaints: denies  Denies dysmenorrhea, dyspareunia, genital discharge, genital ulcers, irregular/heavy menses, pelvic pain and vulvar/vaginal symptoms  Periods are regular every 28-30 days, lasting 3 days.  Dysmenorrhea: none.   Cyclic symptoms include breast tenderness  Sexually active: Yes - single partner - male (boyfriend)  Hx STI: denies   Hx Abnormal pap: denies  Last pap:  - NILM, HPV neg     OB   ( x2)  Pregnancy complications: breech delivery       Complaints: just today  Denies urinary frequency, hematuria, urinary incontinence and dysuria     BREAST  Complaints: denies  Denies: breast lump, breast tenderness, changed mole, dryness, nipple discharge, pruritus, rash, skin color change and skin lesion(s)  Last mammogram:  - birads2, TC lifetime risk 29% priior (18% this yearm getting mammo/ABUS)  Personal hx: small fatty changes, benign; dense breast  Family hx:   Mother with breast ca 2x (different types,  "1st at 53, 62). Mom confirmed prior testing negative for BRCA  Maternal grandmother with breast ca  Maternal aunt with ovarian ca  Patient does do regular self-exams     GENERAL  PMH reviewed/updated and is as below.   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  Exercise: treadmill, tennis  Diet: nothing specific     SCREENING  Cervical Ca: pap up to date  Breast Ca: mammo slip given  Colon Ca: options reviewed; interested in cologuard      Past Medical History:   Diagnosis Date    Migraine        Past Surgical History:   Procedure Laterality Date    KNEE SURGERY           Current Outpatient Medications:     ondansetron (ZOFRAN-ODT) 4 mg disintegrating tablet, ondansetron 4 mg disintegrating tablet, Disp: , Rfl:     Restasis 0.05 % ophthalmic emulsion, , Disp: , Rfl:     SUMAtriptan (IMITREX) 25 mg tablet, Take 1 tablet (25 mg total) by mouth once as needed for migraine for up to 1 dose, Disp: 30 tablet, Rfl: 2    No Known Allergies    Social History     Tobacco Use    Smoking status: Never    Smokeless tobacco: Never   Vaping Use    Vaping status: Never Used   Substance Use Topics    Alcohol use: Not Currently    Drug use: Never             Objective  /78   Ht 5' 6\" (1.676 m)   Wt 65.9 kg (145 lb 3.2 oz)   LMP 05/24/2024 (Exact Date)   BMI 23.44 kg/m²      Physical Exam:  Physical Exam  Exam conducted with a chaperone present.   Constitutional:       General: She is not in acute distress.     Appearance: Normal appearance. She is well-developed. She is not ill-appearing, toxic-appearing or diaphoretic.   HENT:      Head: Normocephalic and atraumatic.   Eyes:      General: No scleral icterus.        Right eye: No discharge.         Left eye: No discharge.      Conjunctiva/sclera: Conjunctivae normal.   Cardiovascular:      Rate and Rhythm: Normal rate.   Pulmonary:      Effort: Pulmonary effort is normal. No accessory muscle usage or respiratory distress.   Chest:   Breasts:     Breasts " are symmetrical.      Right: No inverted nipple, mass, nipple discharge, skin change or tenderness.      Left: No inverted nipple, mass, nipple discharge, skin change or tenderness.   Abdominal:      General: There is no distension.      Palpations: Abdomen is soft. There is no mass.      Tenderness: There is no abdominal tenderness. There is no guarding or rebound.   Genitourinary:     General: Normal vulva.      Exam position: Lithotomy position.      Labia:         Right: No rash, tenderness or lesion.         Left: No rash, tenderness or lesion.       Urethra: No prolapse, urethral swelling or urethral lesion.      Vagina: No signs of injury. No vaginal discharge, erythema, tenderness, bleeding or lesions.      Cervix: No cervical motion tenderness, discharge, friability, lesion or erythema.      Uterus: Not enlarged, not fixed and not tender.       Adnexa:         Right: No mass, tenderness or fullness.          Left: No mass, tenderness or fullness.        Rectum: No external hemorrhoid. Normal anal tone.   Lymphadenopathy:      Upper Body:      Right upper body: No axillary or pectoral adenopathy.      Left upper body: No axillary or pectoral adenopathy.   Skin:     General: Skin is warm and dry.      Findings: No erythema or rash.   Neurological:      Mental Status: She is alert.   Psychiatric:         Mood and Affect: Mood normal.         Behavior: Behavior normal.         Thought Content: Thought content normal.         Judgment: Judgment normal.

## 2024-06-05 ENCOUNTER — TELEPHONE (OUTPATIENT)
Age: 49
End: 2024-06-05

## 2024-06-05 NOTE — TELEPHONE ENCOUNTER
Pt calling to follow up on Urine test results. Noted that UA was resulted with possible UTI. Asking if a Urine culture was sent. RN confirmed culture was sent and is still in process. Pt verbalized an understanding. No further questions at this time.

## 2024-06-06 DIAGNOSIS — N30.00 ACUTE CYSTITIS WITHOUT HEMATURIA: Primary | ICD-10-CM

## 2024-06-06 LAB — BACTERIA UR CULT: ABNORMAL

## 2024-06-06 RX ORDER — SULFAMETHOXAZOLE AND TRIMETHOPRIM 800; 160 MG/1; MG/1
1 TABLET ORAL EVERY 12 HOURS SCHEDULED
Qty: 6 TABLET | Refills: 0 | Status: SHIPPED | OUTPATIENT
Start: 2024-06-06 | End: 2024-06-09

## 2024-06-06 RX ORDER — SULFAMETHOXAZOLE AND TRIMETHOPRIM 800; 160 MG/1; MG/1
1 TABLET ORAL EVERY 12 HOURS SCHEDULED
Qty: 6 TABLET | Refills: 0 | Status: SHIPPED | OUTPATIENT
Start: 2024-06-06 | End: 2024-06-06

## 2024-06-07 ENCOUNTER — NURSE TRIAGE (OUTPATIENT)
Age: 49
End: 2024-06-07

## 2024-06-07 DIAGNOSIS — N30.00 ACUTE CYSTITIS WITHOUT HEMATURIA: Primary | ICD-10-CM

## 2024-06-07 NOTE — TELEPHONE ENCOUNTER
Patient calling back to clarify. She states she is inquiring if a susceptibility was tested as she doesn't believe the bactrim ordered is generally effective for E. Faecalis and she would like to confirm and get a different antibiotic if that is the case. Reviewed that susceptibility was completed and it does not appear it was checked against bactrim. Offered to send message to Dr. Carbajal to clarify and get new Rx if needed. Patient verbalizes understanding and appreciation.

## 2024-06-07 NOTE — TELEPHONE ENCOUNTER
Interesting, surprised that they didn't check bactrim sensitivity. Generally this is a good therapy for e fecalis, so I would recommend continuing with current treatment but testing for cure after therapy to insure completely resolves. If not we can add additional treatment.     Urine culture ordered. Instruct for completion at lab at least 24hr after completion of abx.  Yes

## 2024-06-07 NOTE — TELEPHONE ENCOUNTER
Spoke with patient via telephone to relay message per Dr. Carbajal. Patient verbalizes understanding.

## 2024-06-07 NOTE — TELEPHONE ENCOUNTER
"Patient called in stating that she has received her urine cultures back from the portal and has received a call yesterday in regards to the UA and now said that the urine culture came back, and saw that there was a susceptibility to Tetracycline and wanted to know if this is still the correct treatment for her to take or will another medication need to be ordered        Reason for Disposition   Nursing judgment    Answer Assessment - Initial Assessment Questions  1. REASON FOR CALL or QUESTION: \"What is your reason for calling today?\" or \"How can I best help you?\" or \"What question do you have that I can help answer?\"      Patient called in stating that she has received her urine cultures back from the portal and has received a call yesterday in regards to the UA and now said that the urine culture came back, and saw that there was a susceptibility to Tetracycline and wanted to know if this is still the correct treatment for her to take or will another medication need to be ordered    Protocols used: Information Only Call - No Triage-ADULT-[place]    "

## 2024-06-11 DIAGNOSIS — B37.31 VULVOVAGINAL CANDIDIASIS: Primary | ICD-10-CM

## 2024-06-11 LAB
LAB AP GYN PRIMARY INTERPRETATION: NORMAL
Lab: NORMAL
PATH INTERP SPEC-IMP: NORMAL

## 2024-06-11 RX ORDER — FLUCONAZOLE 150 MG/1
150 TABLET ORAL ONCE
Qty: 1 TABLET | Refills: 0 | Status: SHIPPED | OUTPATIENT
Start: 2024-06-11 | End: 2024-06-11

## 2024-06-11 NOTE — RESULT ENCOUNTER NOTE
Please call pt with results. Pap is normal, but suggests psb yeast infection. With treatment for UTI and this, I am going to send diflucan in for her. If she otherwise feels OK, she should take it at the end of her antibiotic course.

## 2024-09-27 LAB — COLOGUARD RESULT REPORTABLE: NEGATIVE

## 2025-03-24 ENCOUNTER — TELEPHONE (OUTPATIENT)
Dept: OBGYN CLINIC | Facility: CLINIC | Age: 50
End: 2025-03-24

## 2025-03-24 NOTE — TELEPHONE ENCOUNTER
Spoke with patient, called to reschedule yearly appointment on 6/9/25 in the [place] office with Dr. Carbajal, due to provider not being in the office that day. Patient stated that she will have to call back to reschedule the appointment.

## 2025-05-30 ENCOUNTER — HOSPITAL ENCOUNTER (OUTPATIENT)
Dept: RADIOLOGY | Facility: CLINIC | Age: 50
Discharge: HOME | End: 2025-05-30
Payer: COMMERCIAL

## 2025-05-30 VITALS — HEIGHT: 66 IN | WEIGHT: 140 LBS | BODY MASS INDEX: 22.5 KG/M2

## 2025-05-30 DIAGNOSIS — Z12.31 BREAST CANCER SCREENING BY MAMMOGRAM: ICD-10-CM

## 2025-05-30 PROCEDURE — 77067 SCR MAMMO BI INCL CAD: CPT

## 2025-05-30 PROCEDURE — 77063 BREAST TOMOSYNTHESIS BI: CPT

## 2025-06-02 ENCOUNTER — RESULTS FOLLOW-UP (OUTPATIENT)
Dept: OBGYN CLINIC | Facility: CLINIC | Age: 50
End: 2025-06-02